# Patient Record
Sex: MALE | Race: WHITE | NOT HISPANIC OR LATINO | Employment: OTHER | URBAN - METROPOLITAN AREA
[De-identification: names, ages, dates, MRNs, and addresses within clinical notes are randomized per-mention and may not be internally consistent; named-entity substitution may affect disease eponyms.]

---

## 2017-12-28 ENCOUNTER — OFFICE VISIT (OUTPATIENT)
Dept: SURGERY | Facility: CLINIC | Age: 72
End: 2017-12-28

## 2017-12-28 VITALS
HEIGHT: 75 IN | SYSTOLIC BLOOD PRESSURE: 130 MMHG | WEIGHT: 203.4 LBS | BODY MASS INDEX: 25.29 KG/M2 | HEART RATE: 84 BPM | OXYGEN SATURATION: 98 % | DIASTOLIC BLOOD PRESSURE: 82 MMHG

## 2017-12-28 DIAGNOSIS — K40.90 RIGHT INGUINAL HERNIA: ICD-10-CM

## 2017-12-28 DIAGNOSIS — K42.9 UMBILICAL HERNIA WITHOUT OBSTRUCTION AND WITHOUT GANGRENE: Primary | ICD-10-CM

## 2017-12-28 PROCEDURE — 99203 OFFICE O/P NEW LOW 30 MIN: CPT | Performed by: SURGERY

## 2017-12-28 RX ORDER — SIMVASTATIN 20 MG
20 TABLET ORAL NIGHTLY
COMMUNITY

## 2017-12-28 RX ORDER — FEXOFENADINE HCL 180 MG/1
180 TABLET ORAL DAILY
COMMUNITY
End: 2018-05-25

## 2017-12-28 RX ORDER — TRIAMTERENE AND HYDROCHLOROTHIAZIDE 37.5; 25 MG/1; MG/1
1 CAPSULE ORAL EVERY MORNING
COMMUNITY

## 2017-12-28 RX ORDER — WARFARIN SODIUM 2 MG/1
TABLET ORAL
COMMUNITY
End: 2018-05-25

## 2017-12-28 RX ORDER — CEFAZOLIN SODIUM 2 G/100ML
2 INJECTION, SOLUTION INTRAVENOUS ONCE
Status: CANCELLED | OUTPATIENT
Start: 2018-01-29 | End: 2018-01-29

## 2017-12-28 RX ORDER — TADALAFIL 5 MG/1
5 TABLET ORAL DAILY PRN
COMMUNITY

## 2017-12-28 NOTE — PROGRESS NOTES
Subjective   Ajith Dimas is a 72 y.o. male who presents to the office in surgical consultation from Jamal Bah MD for a right inguinal hernia.    History of Present Illness     The patient recently noticed an asymmetry in the right groin while showering.  He noticed a bulge that was present.  He is very active with regular exercise and has not had any symptoms of pain.  There has been no change in his bowel or bladder function.    The patient also has a small bulge at the umbilicus that is been present for many years.  Recently he started developing pain at the umbilicus especially when stretching or when pressure is applied.    Review of Systems   Constitutional: Negative for activity change, appetite change, fatigue and fever.   HENT: Negative for trouble swallowing and voice change.    Respiratory: Negative for chest tightness and shortness of breath.    Cardiovascular: Negative for chest pain and palpitations.   Gastrointestinal: Positive for abdominal pain. Negative for blood in stool, constipation, diarrhea, nausea and vomiting.   Endocrine: Negative for cold intolerance and heat intolerance.   Genitourinary: Negative for dysuria and flank pain.   Neurological: Negative for dizziness and light-headedness.   Hematological: Negative for adenopathy. Does not bruise/bleed easily.   Psychiatric/Behavioral: Negative for agitation and confusion.     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 He appears well-developed and well-nourished.  Non-toxic appearance.   Eyes: EOM are normal. No scleral icterus.   Pulmonary/Chest: Effort normal. No respiratory distress.   Abdominal: Soft. Normal appearance. There is no tenderness. A hernia is present. Hernia confirmed positive in the ventral area (Small umbilical hernia that is tender to palpation) and confirmed positive in the right inguinal area (Moderately sized reducible right inguinal hernia). Hernia confirmed negative in the left inguinal area.   Neurological: He is alert and oriented to person, place, and time.   Skin: Skin is warm and dry.   Psychiatric: He has a normal mood and affect. His behavior is normal. Judgment and thought content normal.       Assessment/Plan       The primary encounter diagnosis was Umbilical hernia without obstruction and without gangrene. A diagnosis of Right inguinal hernia was also pertinent to this visit.    The patient has a moderately sized right inguinal hernia and a small symptomatic umbilical hernia.  He has been scheduled for a right inguinal hernia repair and an umbilical hernia repair.  The patient understands the indications, alternatives, risks, and benefits of the procedure and wishes to proceed.

## 2018-01-23 ENCOUNTER — APPOINTMENT (OUTPATIENT)
Dept: PREADMISSION TESTING | Facility: HOSPITAL | Age: 73
End: 2018-01-23

## 2018-01-23 VITALS
RESPIRATION RATE: 16 BRPM | HEIGHT: 76 IN | DIASTOLIC BLOOD PRESSURE: 81 MMHG | SYSTOLIC BLOOD PRESSURE: 120 MMHG | WEIGHT: 201.8 LBS | OXYGEN SATURATION: 98 % | HEART RATE: 82 BPM | TEMPERATURE: 97.5 F | BODY MASS INDEX: 24.57 KG/M2

## 2018-01-23 LAB
ANION GAP SERPL CALCULATED.3IONS-SCNC: 15.7 MMOL/L
BUN BLD-MCNC: 26 MG/DL (ref 8–23)
BUN/CREAT SERPL: 25.7 (ref 7–25)
CALCIUM SPEC-SCNC: 9.7 MG/DL (ref 8.6–10.5)
CHLORIDE SERPL-SCNC: 97 MMOL/L (ref 98–107)
CO2 SERPL-SCNC: 27.3 MMOL/L (ref 22–29)
CREAT BLD-MCNC: 1.01 MG/DL (ref 0.76–1.27)
DEPRECATED RDW RBC AUTO: 42 FL (ref 37–54)
ERYTHROCYTE [DISTWIDTH] IN BLOOD BY AUTOMATED COUNT: 12 % (ref 11.5–14.5)
GFR SERPL CREATININE-BSD FRML MDRD: 73 ML/MIN/1.73
GLUCOSE BLD-MCNC: 87 MG/DL (ref 65–99)
HCT VFR BLD AUTO: 41.9 % (ref 40.4–52.2)
HGB BLD-MCNC: 14.3 G/DL (ref 13.7–17.6)
MCH RBC QN AUTO: 32.7 PG (ref 27–32.7)
MCHC RBC AUTO-ENTMCNC: 34.1 G/DL (ref 32.6–36.4)
MCV RBC AUTO: 95.9 FL (ref 79.8–96.2)
PLATELET # BLD AUTO: 180 10*3/MM3 (ref 140–500)
PMV BLD AUTO: 11.5 FL (ref 6–12)
POTASSIUM BLD-SCNC: 3.5 MMOL/L (ref 3.5–5.2)
RBC # BLD AUTO: 4.37 10*6/MM3 (ref 4.6–6)
SODIUM BLD-SCNC: 140 MMOL/L (ref 136–145)
WBC NRBC COR # BLD: 8.21 10*3/MM3 (ref 4.5–10.7)

## 2018-01-23 PROCEDURE — 80048 BASIC METABOLIC PNL TOTAL CA: CPT | Performed by: SURGERY

## 2018-01-23 PROCEDURE — 85027 COMPLETE CBC AUTOMATED: CPT | Performed by: SURGERY

## 2018-01-23 PROCEDURE — 36415 COLL VENOUS BLD VENIPUNCTURE: CPT

## 2018-01-23 PROCEDURE — 93010 ELECTROCARDIOGRAM REPORT: CPT | Performed by: INTERNAL MEDICINE

## 2018-01-23 PROCEDURE — 93005 ELECTROCARDIOGRAM TRACING: CPT

## 2018-01-23 NOTE — DISCHARGE INSTRUCTIONS
Take the following medications the morning of surgery with a small sip of water: DYAZIDE    ARRIVAL TIME:  07:30        General Instructions:  • Do not eat solid food after midnight the night before surgery.  • You may drink clear liquids day of surgery but must stop at least one hour before your hospital arrival time.  • It is beneficial for you to have a clear drink that contains carbohydrates the day of surgery.  We suggest a 12 to 20 ounce bottle of Gatorade or Powerade for non-diabetic patients or a 12 to 20 ounce bottle of G2 or Powerade Zero for diabetic patients. (Pediatric patients, are not advised to drink a 12 to 20 ounce carbohydrate drink)    Clear liquids are liquids you can see through.  Nothing red in color.     Plain water                               Sports drinks  Sodas                                   Gelatin (Jell-O)  Fruit juices without pulp such as white grape juice and apple juice  Popsicles that contain no fruit or yogurt  Tea or coffee (no cream or milk added)  Gatorade / Powerade  G2 / Powerade Zero    • Patients who avoid smoking, chewing tobacco and alcohol for 4 weeks prior to surgery have a reduced risk of post-operative complications.  Quit smoking as many days before surgery as you can.  • Do not smoke, use chewing tobacco or drink alcohol the day of surgery.   • Bring any papers given to you in the doctor’s office.  • Wear clean comfortable clothes and socks.   • Do not wear contact lenses or make-up.  Bring a case for your glasses.   • Remove all piercings.  Leave jewelry and any other valuables at home.  • The Pre-Admission Testing nurse will instruct you to bring medications if unable to obtain an accurate list in Pre-Admission Testing.            Preventing a Surgical Site Infection:  • For 2 to 3 days before surgery, avoid shaving with a razor because the razor can irritate skin and make it easier to develop an infection.  • The night prior to surgery sleep in a clean bed  with clean clothing.  Do not allow pets to sleep with you.  • Shower on the morning of surgery using a fresh bar of anti-bacterial soap (such as Dial) and clean washcloth.  Dry with a clean towel and dress in clean clothing.  • Ask your surgeon if you will be receiving antibiotics prior to surgery.  • Make sure you, your family, and all healthcare providers clean their hands with soap and water or an alcohol based hand  before caring for you or your wound.    Day of surgery:  Upon arrival, a Pre-op nurse and Anesthesiologist will review your health history, obtain vital signs, and answer questions you may have.  The only belongings needed at this time will be your home medications and if applicable your C-PAP/BI-PAP machine.  If you are staying overnight your family can leave the rest of your belongings in the car and bring them to your room later.  A Pre-op nurse will start an IV and you may receive medication in preparation for surgery, including something to help you relax.  Your family will be able to see you in the Pre-op area.  While you are in surgery your family should notify the waiting room  if they leave the waiting room area and provide a contact phone number.    Please be aware that surgery does come with discomfort.  We want to make every effort to control your discomfort so please discuss any uncontrolled symptoms with your nurse.   Your doctor will most likely have prescribed pain medications.      If you are going home after surgery you will receive individualized written care instructions before being discharged.  A responsible adult must drive you to and from the hospital on the day of your surgery and stay with you for 24 hours.    If you are staying overnight following surgery, you will be transported to your hospital room following the recovery period.  [facility] has all private rooms.    If you have any questions please call Pre-Admission Testing at  [phone].  Deductibles and co-payments are collected on the day of service. Please be prepared to pay the required co-pay, deductible or deposit on the day of service as defined by your plan.

## 2018-01-29 ENCOUNTER — HOSPITAL ENCOUNTER (OUTPATIENT)
Facility: HOSPITAL | Age: 73
Setting detail: HOSPITAL OUTPATIENT SURGERY
Discharge: HOME | End: 2018-01-29
Attending: SURGERY | Admitting: SURGERY

## 2018-01-29 ENCOUNTER — ANESTHESIA (OUTPATIENT)
Dept: PERIOP | Facility: HOSPITAL | Age: 73
End: 2018-01-29

## 2018-01-29 ENCOUNTER — ANESTHESIA EVENT (OUTPATIENT)
Dept: PERIOP | Facility: HOSPITAL | Age: 73
End: 2018-01-29

## 2018-01-29 VITALS
HEART RATE: 66 BPM | RESPIRATION RATE: 16 BRPM | TEMPERATURE: 97.8 F | WEIGHT: 202.6 LBS | OXYGEN SATURATION: 97 % | DIASTOLIC BLOOD PRESSURE: 60 MMHG | BODY MASS INDEX: 25.19 KG/M2 | SYSTOLIC BLOOD PRESSURE: 107 MMHG | HEIGHT: 75 IN

## 2018-01-29 DIAGNOSIS — K40.90 RIGHT INGUINAL HERNIA: ICD-10-CM

## 2018-01-29 LAB
INR PPP: 1.02 (ref 0.9–1.1)
PROTHROMBIN TIME: 13 SECONDS (ref 11.7–14.2)

## 2018-01-29 PROCEDURE — S0260 H&P FOR SURGERY: HCPCS | Performed by: SURGERY

## 2018-01-29 PROCEDURE — C1781 MESH (IMPLANTABLE): HCPCS | Performed by: SURGERY

## 2018-01-29 PROCEDURE — 25010000003 CEFAZOLIN IN DEXTROSE 2-4 GM/100ML-% SOLUTION: Performed by: SURGERY

## 2018-01-29 PROCEDURE — 25010000002 ONDANSETRON PER 1 MG: Performed by: NURSE ANESTHETIST, CERTIFIED REGISTERED

## 2018-01-29 PROCEDURE — 49585 PR REPAIR UMBILICAL HERN,5+Y/O,REDUC: CPT | Performed by: SURGERY

## 2018-01-29 PROCEDURE — 25010000002 FENTANYL CITRATE (PF) 100 MCG/2ML SOLUTION: Performed by: NURSE ANESTHETIST, CERTIFIED REGISTERED

## 2018-01-29 PROCEDURE — 25010000002 MIDAZOLAM PER 1 MG: Performed by: ANESTHESIOLOGY

## 2018-01-29 PROCEDURE — 49505 PRP I/HERN INIT REDUC >5 YR: CPT | Performed by: SURGERY

## 2018-01-29 PROCEDURE — 85610 PROTHROMBIN TIME: CPT | Performed by: SURGERY

## 2018-01-29 PROCEDURE — 25010000002 PROPOFOL 10 MG/ML EMULSION: Performed by: NURSE ANESTHETIST, CERTIFIED REGISTERED

## 2018-01-29 DEVICE — VENTRALEX ST HERNIA PATCH
Type: IMPLANTABLE DEVICE | Site: ABDOMEN | Status: FUNCTIONAL
Brand: VENTRALEX ST HERNIA PATCH

## 2018-01-29 DEVICE — BARD SOFT MESH
Type: IMPLANTABLE DEVICE | Site: GROIN | Status: FUNCTIONAL
Brand: BARD SOFT MESH

## 2018-01-29 RX ORDER — PROMETHAZINE HYDROCHLORIDE 25 MG/1
12.5 TABLET ORAL ONCE AS NEEDED
Status: DISCONTINUED | OUTPATIENT
Start: 2018-01-29 | End: 2018-01-29 | Stop reason: HOSPADM

## 2018-01-29 RX ORDER — FAMOTIDINE 10 MG/ML
20 INJECTION, SOLUTION INTRAVENOUS ONCE
Status: COMPLETED | OUTPATIENT
Start: 2018-01-29 | End: 2018-01-29

## 2018-01-29 RX ORDER — HYDRALAZINE HYDROCHLORIDE 20 MG/ML
5 INJECTION INTRAMUSCULAR; INTRAVENOUS
Status: DISCONTINUED | OUTPATIENT
Start: 2018-01-29 | End: 2018-01-29 | Stop reason: HOSPADM

## 2018-01-29 RX ORDER — FENTANYL CITRATE 50 UG/ML
50 INJECTION, SOLUTION INTRAMUSCULAR; INTRAVENOUS
Status: DISCONTINUED | OUTPATIENT
Start: 2018-01-29 | End: 2018-01-29 | Stop reason: HOSPADM

## 2018-01-29 RX ORDER — EPHEDRINE SULFATE 50 MG/ML
INJECTION, SOLUTION INTRAVENOUS AS NEEDED
Status: DISCONTINUED | OUTPATIENT
Start: 2018-01-29 | End: 2018-01-29 | Stop reason: SURG

## 2018-01-29 RX ORDER — FENTANYL CITRATE 50 UG/ML
INJECTION, SOLUTION INTRAMUSCULAR; INTRAVENOUS AS NEEDED
Status: DISCONTINUED | OUTPATIENT
Start: 2018-01-29 | End: 2018-01-29 | Stop reason: SURG

## 2018-01-29 RX ORDER — PROMETHAZINE HYDROCHLORIDE 25 MG/ML
12.5 INJECTION, SOLUTION INTRAMUSCULAR; INTRAVENOUS ONCE AS NEEDED
Status: DISCONTINUED | OUTPATIENT
Start: 2018-01-29 | End: 2018-01-29 | Stop reason: HOSPADM

## 2018-01-29 RX ORDER — DIPHENHYDRAMINE HYDROCHLORIDE 50 MG/ML
12.5 INJECTION INTRAMUSCULAR; INTRAVENOUS
Status: DISCONTINUED | OUTPATIENT
Start: 2018-01-29 | End: 2018-01-29 | Stop reason: HOSPADM

## 2018-01-29 RX ORDER — PROMETHAZINE HYDROCHLORIDE 25 MG/1
25 SUPPOSITORY RECTAL ONCE AS NEEDED
Status: DISCONTINUED | OUTPATIENT
Start: 2018-01-29 | End: 2018-01-29 | Stop reason: HOSPADM

## 2018-01-29 RX ORDER — ONDANSETRON 4 MG/1
4 TABLET, FILM COATED ORAL EVERY 6 HOURS PRN
Qty: 20 TABLET | Refills: 0 | Status: SHIPPED | OUTPATIENT
Start: 2018-01-29 | End: 2018-02-13

## 2018-01-29 RX ORDER — ONDANSETRON 2 MG/ML
INJECTION INTRAMUSCULAR; INTRAVENOUS AS NEEDED
Status: DISCONTINUED | OUTPATIENT
Start: 2018-01-29 | End: 2018-01-29 | Stop reason: SURG

## 2018-01-29 RX ORDER — LIDOCAINE HYDROCHLORIDE 10 MG/ML
0.5 INJECTION, SOLUTION EPIDURAL; INFILTRATION; INTRACAUDAL; PERINEURAL ONCE AS NEEDED
Status: DISCONTINUED | OUTPATIENT
Start: 2018-01-29 | End: 2018-01-29 | Stop reason: HOSPADM

## 2018-01-29 RX ORDER — EPHEDRINE SULFATE 50 MG/ML
5 INJECTION, SOLUTION INTRAVENOUS ONCE AS NEEDED
Status: DISCONTINUED | OUTPATIENT
Start: 2018-01-29 | End: 2018-01-29 | Stop reason: HOSPADM

## 2018-01-29 RX ORDER — MIDAZOLAM HYDROCHLORIDE 1 MG/ML
2 INJECTION INTRAMUSCULAR; INTRAVENOUS
Status: DISCONTINUED | OUTPATIENT
Start: 2018-01-29 | End: 2018-01-29 | Stop reason: HOSPADM

## 2018-01-29 RX ORDER — LIDOCAINE HYDROCHLORIDE 20 MG/ML
INJECTION, SOLUTION INFILTRATION; PERINEURAL AS NEEDED
Status: DISCONTINUED | OUTPATIENT
Start: 2018-01-29 | End: 2018-01-29 | Stop reason: SURG

## 2018-01-29 RX ORDER — OXYCODONE AND ACETAMINOPHEN 7.5; 325 MG/1; MG/1
1 TABLET ORAL ONCE AS NEEDED
Status: COMPLETED | OUTPATIENT
Start: 2018-01-29 | End: 2018-01-29

## 2018-01-29 RX ORDER — PROPOFOL 10 MG/ML
VIAL (ML) INTRAVENOUS AS NEEDED
Status: DISCONTINUED | OUTPATIENT
Start: 2018-01-29 | End: 2018-01-29 | Stop reason: SURG

## 2018-01-29 RX ORDER — SODIUM CHLORIDE, SODIUM LACTATE, POTASSIUM CHLORIDE, CALCIUM CHLORIDE 600; 310; 30; 20 MG/100ML; MG/100ML; MG/100ML; MG/100ML
9 INJECTION, SOLUTION INTRAVENOUS CONTINUOUS
Status: DISCONTINUED | OUTPATIENT
Start: 2018-01-29 | End: 2018-01-29 | Stop reason: HOSPADM

## 2018-01-29 RX ORDER — MIDAZOLAM HYDROCHLORIDE 1 MG/ML
1 INJECTION INTRAMUSCULAR; INTRAVENOUS
Status: DISCONTINUED | OUTPATIENT
Start: 2018-01-29 | End: 2018-01-29 | Stop reason: HOSPADM

## 2018-01-29 RX ORDER — OXYCODONE HYDROCHLORIDE AND ACETAMINOPHEN 5; 325 MG/1; MG/1
TABLET ORAL
Qty: 40 TABLET | Refills: 0 | Status: SHIPPED | OUTPATIENT
Start: 2018-01-29 | End: 2018-02-13

## 2018-01-29 RX ORDER — NALOXONE HCL 0.4 MG/ML
0.2 VIAL (ML) INJECTION AS NEEDED
Status: DISCONTINUED | OUTPATIENT
Start: 2018-01-29 | End: 2018-01-29 | Stop reason: HOSPADM

## 2018-01-29 RX ORDER — SODIUM CHLORIDE 0.9 % (FLUSH) 0.9 %
1-10 SYRINGE (ML) INJECTION AS NEEDED
Status: DISCONTINUED | OUTPATIENT
Start: 2018-01-29 | End: 2018-01-29 | Stop reason: HOSPADM

## 2018-01-29 RX ORDER — CEFAZOLIN SODIUM 2 G/100ML
2 INJECTION, SOLUTION INTRAVENOUS ONCE
Status: COMPLETED | OUTPATIENT
Start: 2018-01-29 | End: 2018-01-29

## 2018-01-29 RX ORDER — PROMETHAZINE HYDROCHLORIDE 25 MG/1
25 TABLET ORAL ONCE AS NEEDED
Status: DISCONTINUED | OUTPATIENT
Start: 2018-01-29 | End: 2018-01-29 | Stop reason: HOSPADM

## 2018-01-29 RX ORDER — ONDANSETRON 2 MG/ML
4 INJECTION INTRAMUSCULAR; INTRAVENOUS ONCE AS NEEDED
Status: DISCONTINUED | OUTPATIENT
Start: 2018-01-29 | End: 2018-01-29 | Stop reason: HOSPADM

## 2018-01-29 RX ORDER — DIAZEPAM 5 MG/1
10 TABLET ORAL ONCE AS NEEDED
Status: DISCONTINUED | OUTPATIENT
Start: 2018-01-29 | End: 2018-01-29 | Stop reason: HOSPADM

## 2018-01-29 RX ORDER — FLUMAZENIL 0.1 MG/ML
0.2 INJECTION INTRAVENOUS AS NEEDED
Status: DISCONTINUED | OUTPATIENT
Start: 2018-01-29 | End: 2018-01-29 | Stop reason: HOSPADM

## 2018-01-29 RX ORDER — HYDROCODONE BITARTRATE AND ACETAMINOPHEN 7.5; 325 MG/1; MG/1
1 TABLET ORAL ONCE AS NEEDED
Status: DISCONTINUED | OUTPATIENT
Start: 2018-01-29 | End: 2018-01-29 | Stop reason: HOSPADM

## 2018-01-29 RX ORDER — BUPIVACAINE HYDROCHLORIDE AND EPINEPHRINE 5; 5 MG/ML; UG/ML
INJECTION, SOLUTION PERINEURAL AS NEEDED
Status: DISCONTINUED | OUTPATIENT
Start: 2018-01-29 | End: 2018-01-29 | Stop reason: HOSPADM

## 2018-01-29 RX ORDER — LABETALOL HYDROCHLORIDE 5 MG/ML
5 INJECTION, SOLUTION INTRAVENOUS
Status: DISCONTINUED | OUTPATIENT
Start: 2018-01-29 | End: 2018-01-29 | Stop reason: HOSPADM

## 2018-01-29 RX ORDER — SCOLOPAMINE TRANSDERMAL SYSTEM 1 MG/1
1 PATCH, EXTENDED RELEASE TRANSDERMAL ONCE AS NEEDED
Status: DISCONTINUED | OUTPATIENT
Start: 2018-01-29 | End: 2018-01-29 | Stop reason: HOSPADM

## 2018-01-29 RX ORDER — HYDROMORPHONE HCL 110MG/55ML
0.5 PATIENT CONTROLLED ANALGESIA SYRINGE INTRAVENOUS
Status: DISCONTINUED | OUTPATIENT
Start: 2018-01-29 | End: 2018-01-29 | Stop reason: HOSPADM

## 2018-01-29 RX ADMIN — ONDANSETRON 4 MG: 2 INJECTION INTRAMUSCULAR; INTRAVENOUS at 11:23

## 2018-01-29 RX ADMIN — LIDOCAINE HYDROCHLORIDE 100 MG: 20 INJECTION, SOLUTION INFILTRATION; PERINEURAL at 09:32

## 2018-01-29 RX ADMIN — MIDAZOLAM 1 MG: 1 INJECTION INTRAMUSCULAR; INTRAVENOUS at 07:53

## 2018-01-29 RX ADMIN — FENTANYL CITRATE 25 MCG: 50 INJECTION, SOLUTION INTRAMUSCULAR; INTRAVENOUS at 11:10

## 2018-01-29 RX ADMIN — EPHEDRINE SULFATE 10 MG: 50 INJECTION INTRAMUSCULAR; INTRAVENOUS; SUBCUTANEOUS at 09:50

## 2018-01-29 RX ADMIN — SCOPALAMINE 1 PATCH: 1 PATCH, EXTENDED RELEASE TRANSDERMAL at 07:53

## 2018-01-29 RX ADMIN — SODIUM CHLORIDE, POTASSIUM CHLORIDE, SODIUM LACTATE AND CALCIUM CHLORIDE 9 ML/HR: 600; 310; 30; 20 INJECTION, SOLUTION INTRAVENOUS at 07:53

## 2018-01-29 RX ADMIN — OXYCODONE HYDROCHLORIDE AND ACETAMINOPHEN 1 TABLET: 7.5; 325 TABLET ORAL at 12:28

## 2018-01-29 RX ADMIN — FENTANYL CITRATE 50 MCG: 50 INJECTION, SOLUTION INTRAMUSCULAR; INTRAVENOUS at 10:48

## 2018-01-29 RX ADMIN — FENTANYL CITRATE 75 MCG: 50 INJECTION, SOLUTION INTRAMUSCULAR; INTRAVENOUS at 09:46

## 2018-01-29 RX ADMIN — PROPOFOL 200 MG: 10 INJECTION, EMULSION INTRAVENOUS at 09:32

## 2018-01-29 RX ADMIN — CEFAZOLIN SODIUM 2 G: 2 INJECTION, SOLUTION INTRAVENOUS at 09:32

## 2018-01-29 RX ADMIN — FENTANYL CITRATE 75 MCG: 50 INJECTION, SOLUTION INTRAMUSCULAR; INTRAVENOUS at 09:32

## 2018-01-29 RX ADMIN — FAMOTIDINE 20 MG: 10 INJECTION, SOLUTION INTRAVENOUS at 07:53

## 2018-01-29 RX ADMIN — FENTANYL CITRATE 25 MCG: 50 INJECTION, SOLUTION INTRAMUSCULAR; INTRAVENOUS at 10:16

## 2018-01-29 NOTE — OP NOTE
Surgeon: Jose Pierson Jr., M.D.    Assistant: Jose Pierson M.D.    Pre-Operative Diagnosis: Right inguinal hernia [K40.90] and umbilical hernia    Post-Op Diagnosis Codes:     * Right inguinal hernia [K40.90] and umbilical hernia      Procedure Performed: Right inguinal hernia repair and umbilical hernia repair with mesh    Indications:     The patient is a very pleasant 72-year-old white male who is developed a bulge in the right groin that is gotten larger and increasingly symptomatic.  He also is a bulge at the umbilicus that is.  We symptomatic.  He has been scheduled for a right inguinal hernia repair and umbilical hernia repair.  The patient understands the indications, alternatives, risks, and benefits of the procedure and wishes to proceed.    Procedure:     The patient was identified and taken to the operating room where he was placed in the supine position on the operating table.  Monitors were placed and he underwent a general endotracheal anesthesia and was appropriately monitored throughout the case by the anesthesia personnel.  The right groin was prepped and draped in the standard surgical fashion.  A standard groin incision was made with a scalpel and carried through the skin into the subcutaneous tissue.  The subcutaneous tissue was divided using Bovie electrocautery to the external oblique aponeurosis which was then divided in direction of its fibers.  The spermatic cord was then identified, surrounded, and elevated out of the groin with a Penrose drain.  The spermatic cord was skeletonized by dividing cremasteric fibers using Bovie electrocautery.  The patient was noted to have a pantaloon hernia.  The hernia was freed from attachments to the spermatic cord and the indirect hernia was divided and a high ligation fashion using 2-0 Vicryl suture and the direct hernia was completely reduced.  A piece of mesh was selected, soaked in antibiotics, and fashioned appropriately using a 3 x 6 Bard  soft mesh.  It was then sutured in place using a Denny type repair.  It was sutured to the pubic tubercle and along the inguinal ligament using 2-0 proline sutures.  It was then tacked along conjoined tendon using 0 Ethibond sutures in an interrupted fashion.  Legs were created to reestablish the internal ring and secured with the 0 Ethibond sutures.  The cord was noted to be hemostatic.  The area was infiltrated with 0.5% Marcaine with epinephrine as well as Exparel.  The spermatic cord was returned to its original position.  The external oblique aponeurosis was reapproximated using 2-0 Vicryl sutures.  Preethi's fascia was reapproximated using 3-0 Vicryl sutures in a running fashion.  The skin was then closed with 4-0 Monocryl in a subcuticular fashion followed by Mastisol and Steri-Strips and a sterile dressing.      Attention was then turned to the umbilical hernia repair.  A periumbilical incision was made and carried through the skin into the subcutaneous tissue.  The subcutaneous tissue was divided using Bovie electrocautery down to the level of the fascia and the hernia was surrounded.  The umbilicus was then taken off the hernia sac using Bovie electrocautery.  The hernia sac was freed from all subcutaneous attachments all the way down to the fascial defect.  The hernia was freed at the fascial edge using Bovie electrocautery and completely reduced.  The hernia defect was about 2 cm in size.  The preperitoneal space was dissected to permit the mesh.  A piece of Ventralex ST mesh, size small, was selected and placed in a subfascial position in the preperitoneal space.  The fascia was then closed with 0 PDS sutures in an interrupted fashion grasping the legs of the mesh within the closure to secure the mesh in the proper position.  The entire area was then infiltrated with 0.5% Marcaine with epinephrine.  The umbilicus was tacked down to the fascia using 3-0 Vicryls suture in an interrupted fashion.   The subcutaneous tissue was then closed with 3-0 Vicryls suture in a running fashion.  The skin was then closed with a 4-0 Monocryl in a subcuticular fashion followed by benzoin and Steri-Strips.  A sterile dressing was applied.     The sponge, needle, and instrument counts were correct at the end the case.  The patient tolerated procedure well and was transferred to the recovery room in stable condition.    Estimated Blood Loss:  minimal      Specimens:   Order Name Source Comment Collection Info Order Time   PROTIME-INR   Collected By: Aileen Casas RN 1/29/2018  7:15 AM

## 2018-01-29 NOTE — H&P
Subjective       Ajith Dimas is a 72 y.o. male who presents for a right inguinal hernia and umbilical hernia.     History of Present Illness      The patient recently noticed an asymmetry in the right groin while showering.  He noticed a bulge that was present.  He is very active with regular exercise and has not had any symptoms of pain.  There has been no change in his bowel or bladder function.     The patient also has a small bulge at the umbilicus that is been present for many years.  Recently he started developing pain at the umbilicus especially when stretching or when pressure is applied.     Review of Systems   Constitutional: Negative for activity change, appetite change, fatigue and fever.   HENT: Negative for trouble swallowing and voice change.    Respiratory: Negative for chest tightness and shortness of breath.    Cardiovascular: Negative for chest pain and palpitations.   Gastrointestinal: Positive for abdominal pain. Negative for blood in stool, constipation, diarrhea, nausea and vomiting.   Endocrine: Negative for cold intolerance and heat intolerance.   Genitourinary: Negative for dysuria and flank pain.   Neurological: Negative for dizziness and light-headedness.   Hematological: Negative for adenopathy. Does not bruise/bleed easily.   Psychiatric/Behavioral: Negative for agitation and confusion.       Medical History    Past Medical History:   Diagnosis Date   • Deep vein thrombosis     • Hernia, inguinal     • Hypertension            Surgical History          Past Surgical History:   Procedure Laterality Date   • CARDIAC CATHETERIZATION       • TONSILLECTOMY AND ADENOIDECTOMY                   Family History   Problem Relation Age of Onset   • Breast cancer Mother     • Colon polyps Father     • Melanoma Brother     • Prostate cancer Brother         Social History    Social History            Social History   • Marital status: Single       Spouse name: N/A   • Number of children: N/A   • Years  of education: N/A           Occupational History   • 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 He appears well-developed and well-nourished.  Non-toxic appearance.   Eyes: EOM are normal. No scleral icterus.   Pulmonary/Chest: Effort normal. No respiratory distress.   Abdominal: Soft. Normal appearance. There is no tenderness. A hernia is present. Hernia confirmed positive in the ventral area (Small umbilical hernia that is tender to palpation) and confirmed positive in the right inguinal area (Moderately sized reducible right inguinal hernia). Hernia confirmed negative in the left inguinal area.   Neurological: He is alert and oriented to person, place, and time.   Skin: Skin is warm and dry.   Psychiatric: He has a normal mood and affect. His behavior is normal. Judgment and thought content normal.            Assessment/Plan             The primary encounter diagnosis was Umbilical hernia without obstruction and without gangrene. A diagnosis of Right inguinal hernia was also pertinent to this visit.     The patient has a moderately sized right inguinal hernia and a small symptomatic umbilical hernia.  He has been scheduled for a right inguinal hernia repair and an umbilical hernia repair.  The patient understands the indications, alternatives, risks, and benefits of the procedure and wishes to proceed.

## 2018-01-29 NOTE — PLAN OF CARE
Problem: Patient Care Overview (Adult)  Goal: Plan of Care Review  Outcome: Outcome(s) achieved Date Met: 01/29/18 01/29/18 1408   Coping/Psychosocial Response Interventions   Plan Of Care Reviewed With patient;significant other   Patient Care Overview   Progress improving   Outcome Evaluation   Outcome Summary/Follow up Plan ready for discharge     Goal: Adult Individualization and Mutuality  Outcome: Outcome(s) achieved Date Met: 01/29/18    Goal: Discharge Needs Assessment  Outcome: Outcome(s) achieved Date Met: 01/29/18      Problem: Perioperative Period (Adult)  Goal: Signs and Symptoms of Listed Potential Problems Will be Absent or Manageable (Perioperative Period)  Outcome: Outcome(s) achieved Date Met: 01/29/18 01/29/18 1408   Perioperative Period   Problems Assessed (Perioperative Period) pain;wound complications;hemorrhage;hypoxia/hypoxemia;perioperative injury;situational response;physiologic stress response;infection   Problems Present (Perioperative Period) pain

## 2018-01-29 NOTE — DISCHARGE INSTRUCTIONS
Please remove Scopolamine patch from behind Right ear in the morning. Fold in half and discard in trash. Wash hands immediately.        Dr. Jose Pierson and Dr. Isai Mercado  [address]  [place] [id number] [phone]    Discharge Instructions for Hernia Surgery      1. Go home, rest and take it easy today; however, you should get up and move about several times today to reduce the risk of developing a clot in your legs.      2. You may experience some dizziness or memory loss from the anesthesia.  This may last for the next 24 hours.  Someone should plan on staying with you for the first 24 hours for your safety.    3. Do not make any important legal decisions or sign any legal papers for the next 24 hours.      4. Eat and drink lightly today.  Start off with liquids, jello, soup, crackers or other bland foods at first. You may advance your diet tomorrow as tolerated as long as you do not experience any nausea or vomiting.     5. You may remove your outer dressings in 2 days.  The white tapes called steri-strips should stay in place.  They will fall off on their own in 1-2 weeks.  Do not worry if they come off sooner.      6. You may notice some bleeding/drainage on your outer dressings. A little bloody drainage is normal. If the bleeding/drainage is such that the bandage cannot absorb it, remove the dressing, apply clean gauze and apply firm pressure for a full 15 minutes.  If the bleeding continues, please call me.    7. You may shower tomorrow.  No tub baths until your incisions are completely healed.     8. No lifting > 20 lbs. until you are seen at your follow-up visit.         9. You have received a prescription for a narcotic pain medicine, as you will have some pain following surgery.   You will not be totally pain free, but your pain medicine should make the pain tolerable.  Please take your pain medicine as prescribed and always take your pills with food to prevent nausea. If you are  having severe pain that cannot be controlled by the pain medicine, please contact me.      10. If you had a laparoscopic surgery, it is not unusual to experience pain/discomfort in your shoulders or under your ribs after surgery.  It is from the gas used during the laparoscopic procedure and usually lasts 1-3 days.  The prescription pain medicine is used to treat the surgical pain and does not typically alleviate this “gassy” pain.     11. No driving for 24 hours and for as long as you are taking your prescription pain medicine.      12. You will need to call the office at [phone] to schedule a follow-up appointment in 2 weeks.           13. Remember to contact me for any of the following:    • Fever > 101 degrees  • Severe pain that cannot be controlled by taking your pain pills  • Severe nausea or vomiting   • Significant bleeding of your incisions  • Drainage that has a bad smell or is yellow or green in appearance  • Any other questions or concerns        Additional Instruction for Inguinal Hernia Patients Only    1. If you did not urinate at the hospital after your surgery or if you feel the need to urinate and cannot, this will necessitate a return to the Emergency Room for placement of a urinary catheter.  You should also notify me as well.  As a rule, you should be able to empty your bladder within 4-6 hours after discharge from the hospital.      You may notice some scrotal bruising and/or swelling. A scrotal support or briefs as well as ice packs may be used to alleviate discomfort

## 2018-01-29 NOTE — ANESTHESIA POSTPROCEDURE EVALUATION
Patient: Ajith Dimas    Procedure Summary     Date Anesthesia Start Anesthesia Stop Room / Location    01/29/18 0929 1122  [facility] /  [facility]       Procedure Diagnosis Surgeon Provider    RIGHT INGUINAL HERNIA REPAIR WITH MESH (Right Abdomen); UMBILICAL HERNIA REPAIR WITH MESH (N/A Abdomen) Right inguinal hernia  (Right inguinal hernia [K40.90]) MD Elver Best Jr., MD          Anesthesia Type: general  Last vitals  BP   124/44 (01/29/18 1305)   Temp   36.6 °C (97.8 °F) (01/29/18 1230)   Pulse   78 (01/29/18 1305)   Resp   16 (01/29/18 1305)     SpO2   97 % (01/29/18 1305)     Post Anesthesia Care and Evaluation    Patient location during evaluation: PACU  Patient participation: complete - patient participated  Level of consciousness: awake and alert  Pain management: adequate  Airway patency: patent  Anesthetic complications: No anesthetic complications    Cardiovascular status: acceptable  Respiratory status: acceptable  Hydration status: acceptable

## 2018-01-29 NOTE — ANESTHESIA PREPROCEDURE EVALUATION
Anesthesia Evaluation     Patient summary reviewed and Nursing notes reviewed   NPO Solid Status: > 8 hours  NPO Liquid Status: > 4 hours     Airway   Mallampati: II  TM distance: >3 FB  Neck ROM: full  no difficulty expected  Dental - normal exam     Pulmonary - negative pulmonary ROS and normal exam   Cardiovascular - normal exam  Exercise tolerance: good (4-7 METS)    ECG reviewed  PT is on anticoagulation therapy  Rhythm: regular  Rate: normal    (+) hypertension, DVT resolved, hyperlipidemia      Neuro/Psych- negative ROS  GI/Hepatic/Renal/Endo      Musculoskeletal (-) negative ROS    Abdominal  - normal exam   Substance History - negative use     OB/GYN          Other - negative ROS                                               Anesthesia Plan    ASA 2     general     intravenous induction   Anesthetic plan and risks discussed with patient.

## 2018-01-29 NOTE — PLAN OF CARE
Problem: Patient Care Overview (Adult)  Goal: Plan of Care Review  Outcome: Ongoing (interventions implemented as appropriate)   01/29/18 0742   Coping/Psychosocial Response Interventions   Plan Of Care Reviewed With patient   Patient Care Overview   Progress no change     Goal: Adult Individualization and Mutuality  Outcome: Ongoing (interventions implemented as appropriate)   01/29/18 0742   Individualization   Patient Specific Preferences Call pt Ajith   Patient Specific Goals No infection after surgery.   Patient Specific Interventions Teach good hand hygiene.     Goal: Discharge Needs Assessment  Outcome: Ongoing (interventions implemented as appropriate)   01/29/18 0742   Discharge Needs Assessment   Concerns To Be Addressed denies needs/concerns at this time   Self-Care   Equipment Currently Used at Home none   Current Health   Anticipated Changes Related to Illness none       Problem: Perioperative Period (Adult)  Goal: Signs and Symptoms of Listed Potential Problems Will be Absent or Manageable (Perioperative Period)  Outcome: Ongoing (interventions implemented as appropriate)   01/29/18 0742   Perioperative Period   Problems Present (Perioperative Period) none

## 2018-02-13 ENCOUNTER — OFFICE VISIT (OUTPATIENT)
Dept: SURGERY | Facility: CLINIC | Age: 73
End: 2018-02-13

## 2018-02-13 VITALS
HEIGHT: 75 IN | DIASTOLIC BLOOD PRESSURE: 84 MMHG | BODY MASS INDEX: 24.99 KG/M2 | HEART RATE: 85 BPM | OXYGEN SATURATION: 98 % | SYSTOLIC BLOOD PRESSURE: 110 MMHG | WEIGHT: 201 LBS

## 2018-02-13 DIAGNOSIS — Z48.89 POSTOPERATIVE VISIT: Primary | ICD-10-CM

## 2018-02-13 PROCEDURE — 99024 POSTOP FOLLOW-UP VISIT: CPT | Performed by: SURGERY

## 2018-02-13 NOTE — PROGRESS NOTES
Subjective   Ajith Dimas is a 72 y.o. male who returns to the office after undergoing a right inguinal hernia repair and umbilical hernia repair on 1/29/2018.     History of Present Illness     The patient is recovering well with no significant postop symptoms.  He is having no abdominal pain.  He has a good appetite and normal bowel function.  His energy level is good.      Review of Systems   Constitutional: Negative for activity change, appetite change, fatigue and fever.   Respiratory: Negative for chest tightness and shortness of breath.    Cardiovascular: Negative for chest pain and palpitations.   Gastrointestinal: Negative for abdominal pain, constipation, diarrhea and nausea.   Skin: Negative for rash and wound.   Psychiatric/Behavioral: Negative for agitation and confusion.       Objective   Physical Exam   Constitutional:  Non-toxic appearance. He does not appear ill. No distress.   Pulmonary/Chest: Effort normal. No respiratory distress.   Abdominal: Soft. Normal appearance. There is no tenderness.   Neurological: He is alert.   Skin:   Incision: intact with no evidence of infection.   Psychiatric: He has a normal mood and affect. His behavior is normal.       Assessment/Plan   The encounter diagnosis was Postoperative visit.    The patient is recovering well from his right inguinal hernia repair and umbilical hernia repair.  He was advised to avoid heavy lifting for another 1 month.  He will follow-up on an as-needed basis.

## 2018-05-24 ENCOUNTER — OFFICE VISIT (OUTPATIENT)
Dept: SURGERY | Facility: CLINIC | Age: 73
End: 2018-05-24

## 2018-05-24 VITALS
OXYGEN SATURATION: 97 % | SYSTOLIC BLOOD PRESSURE: 110 MMHG | BODY MASS INDEX: 24.94 KG/M2 | WEIGHT: 199.5 LBS | DIASTOLIC BLOOD PRESSURE: 69 MMHG | HEART RATE: 89 BPM

## 2018-05-24 DIAGNOSIS — K40.90 LEFT INGUINAL HERNIA: Primary | ICD-10-CM

## 2018-05-24 PROCEDURE — 99214 OFFICE O/P EST MOD 30 MIN: CPT | Performed by: SURGERY

## 2018-05-24 RX ORDER — CEFAZOLIN SODIUM 2 G/100ML
2 INJECTION, SOLUTION INTRAVENOUS ONCE
Status: CANCELLED | OUTPATIENT
Start: 2018-07-02 | End: 2018-07-02

## 2018-05-25 ENCOUNTER — APPOINTMENT (OUTPATIENT)
Dept: CARDIOLOGY | Facility: HOSPITAL | Age: 73
End: 2018-05-25
Attending: INTERNAL MEDICINE

## 2018-05-25 ENCOUNTER — HOSPITAL ENCOUNTER (OUTPATIENT)
Facility: HOSPITAL | Age: 73
Setting detail: OBSERVATION
Discharge: HOME | End: 2018-05-26
Attending: EMERGENCY MEDICINE | Admitting: INTERNAL MEDICINE

## 2018-05-25 ENCOUNTER — APPOINTMENT (OUTPATIENT)
Dept: MRI IMAGING | Facility: HOSPITAL | Age: 73
End: 2018-05-25

## 2018-05-25 ENCOUNTER — APPOINTMENT (OUTPATIENT)
Dept: GENERAL RADIOLOGY | Facility: HOSPITAL | Age: 73
End: 2018-05-25

## 2018-05-25 ENCOUNTER — APPOINTMENT (OUTPATIENT)
Dept: CT IMAGING | Facility: HOSPITAL | Age: 73
End: 2018-05-25

## 2018-05-25 DIAGNOSIS — G45.4 TRANSIENT GLOBAL AMNESIA: Primary | ICD-10-CM

## 2018-05-25 DIAGNOSIS — Q28.3 BRAIN VASCULAR MALFORMATION: ICD-10-CM

## 2018-05-25 PROBLEM — R76.0 ANTICARDIOLIPIN ANTIBODY POSITIVE: Status: ACTIVE | Noted: 2018-05-25

## 2018-05-25 PROBLEM — E78.5 HYPERLIPIDEMIA: Status: ACTIVE | Noted: 2018-05-25

## 2018-05-25 PROBLEM — Z86.718 HISTORY OF DVT (DEEP VEIN THROMBOSIS): Status: ACTIVE | Noted: 2018-05-25

## 2018-05-25 PROBLEM — Z79.01 LONG TERM CURRENT USE OF ANTICOAGULANT: Status: ACTIVE | Noted: 2018-05-25

## 2018-05-25 PROBLEM — I10 ESSENTIAL HYPERTENSION: Status: ACTIVE | Noted: 2018-05-25

## 2018-05-25 LAB
ALBUMIN SERPL-MCNC: 4.3 G/DL (ref 3.5–5.2)
ALBUMIN/GLOB SERPL: 1.4 G/DL
ALP SERPL-CCNC: 77 U/L (ref 39–117)
ALT SERPL W P-5'-P-CCNC: 19 U/L (ref 1–41)
ANION GAP SERPL CALCULATED.3IONS-SCNC: 13.4 MMOL/L
APTT PPP: 30.4 SECONDS (ref 22.7–35.4)
AST SERPL-CCNC: 23 U/L (ref 1–40)
BASOPHILS # BLD AUTO: 0.02 10*3/MM3 (ref 0–0.2)
BASOPHILS NFR BLD AUTO: 0.3 % (ref 0–1.5)
BILIRUB SERPL-MCNC: 0.4 MG/DL (ref 0.1–1.2)
BILIRUB UR QL STRIP: NEGATIVE
BUN BLD-MCNC: 25 MG/DL (ref 8–23)
BUN/CREAT SERPL: 27.2 (ref 7–25)
CALCIUM SPEC-SCNC: 9.2 MG/DL (ref 8.6–10.5)
CHLORIDE SERPL-SCNC: 99 MMOL/L (ref 98–107)
CLARITY UR: CLEAR
CO2 SERPL-SCNC: 27.6 MMOL/L (ref 22–29)
COLOR UR: YELLOW
CREAT BLD-MCNC: 0.92 MG/DL (ref 0.76–1.27)
D-LACTATE SERPL-SCNC: 1.1 MMOL/L (ref 0.5–2)
DEPRECATED RDW RBC AUTO: 40.6 FL (ref 37–54)
EOSINOPHIL # BLD AUTO: 0.08 10*3/MM3 (ref 0–0.7)
EOSINOPHIL NFR BLD AUTO: 1.1 % (ref 0.3–6.2)
ERYTHROCYTE [DISTWIDTH] IN BLOOD BY AUTOMATED COUNT: 11.9 % (ref 11.5–14.5)
GFR SERPL CREATININE-BSD FRML MDRD: 81 ML/MIN/1.73
GLOBULIN UR ELPH-MCNC: 3.1 GM/DL
GLUCOSE BLD-MCNC: 105 MG/DL (ref 65–99)
GLUCOSE UR STRIP-MCNC: NEGATIVE MG/DL
HCT VFR BLD AUTO: 42.5 % (ref 40.4–52.2)
HGB BLD-MCNC: 14.9 G/DL (ref 13.7–17.6)
HGB UR QL STRIP.AUTO: NEGATIVE
HOLD SPECIMEN: NORMAL
IMM GRANULOCYTES # BLD: 0.01 10*3/MM3 (ref 0–0.03)
IMM GRANULOCYTES NFR BLD: 0.1 % (ref 0–0.5)
INR PPP: 1.88 (ref 0.9–1.1)
INR PPP: 1.99 (ref 0.9–1.1)
KETONES UR QL STRIP: NEGATIVE
LEUKOCYTE ESTERASE UR QL STRIP.AUTO: NEGATIVE
LYMPHOCYTES # BLD AUTO: 1.46 10*3/MM3 (ref 0.9–4.8)
LYMPHOCYTES NFR BLD AUTO: 19.6 % (ref 19.6–45.3)
MCH RBC QN AUTO: 33.1 PG (ref 27–32.7)
MCHC RBC AUTO-ENTMCNC: 35.1 G/DL (ref 32.6–36.4)
MCV RBC AUTO: 94.4 FL (ref 79.8–96.2)
MONOCYTES # BLD AUTO: 0.5 10*3/MM3 (ref 0.2–1.2)
MONOCYTES NFR BLD AUTO: 6.7 % (ref 5–12)
NEUTROPHILS # BLD AUTO: 5.4 10*3/MM3 (ref 1.9–8.1)
NEUTROPHILS NFR BLD AUTO: 72.3 % (ref 42.7–76)
NITRITE UR QL STRIP: NEGATIVE
PH UR STRIP.AUTO: 5.5 [PH] (ref 5–8)
PLATELET # BLD AUTO: 183 10*3/MM3 (ref 140–500)
PMV BLD AUTO: 10.9 FL (ref 6–12)
POTASSIUM BLD-SCNC: 3.5 MMOL/L (ref 3.5–5.2)
PROT SERPL-MCNC: 7.4 G/DL (ref 6–8.5)
PROT UR QL STRIP: ABNORMAL
PROTHROMBIN TIME: 21.3 SECONDS (ref 11.7–14.2)
PROTHROMBIN TIME: 22.3 SECONDS (ref 11.7–14.2)
RBC # BLD AUTO: 4.5 10*6/MM3 (ref 4.6–6)
SODIUM BLD-SCNC: 140 MMOL/L (ref 136–145)
SP GR UR STRIP: 1.02 (ref 1–1.03)
TROPONIN T SERPL-MCNC: <0.01 NG/ML (ref 0–0.03)
TSH SERPL DL<=0.05 MIU/L-ACNC: 2.51 MIU/ML (ref 0.27–4.2)
UROBILINOGEN UR QL STRIP: ABNORMAL
VIT B12 BLD-MCNC: 1674 PG/ML (ref 211–946)
WBC NRBC COR # BLD: 7.46 10*3/MM3 (ref 4.5–10.7)

## 2018-05-25 PROCEDURE — 87040 BLOOD CULTURE FOR BACTERIA: CPT | Performed by: PHYSICIAN ASSISTANT

## 2018-05-25 PROCEDURE — 93306 TTE W/DOPPLER COMPLETE: CPT | Performed by: INTERNAL MEDICINE

## 2018-05-25 PROCEDURE — A9577 INJ MULTIHANCE: HCPCS | Performed by: INTERNAL MEDICINE

## 2018-05-25 PROCEDURE — 70544 MR ANGIOGRAPHY HEAD W/O DYE: CPT

## 2018-05-25 PROCEDURE — 99285 EMERGENCY DEPT VISIT HI MDM: CPT

## 2018-05-25 PROCEDURE — G0378 HOSPITAL OBSERVATION PER HR: HCPCS

## 2018-05-25 PROCEDURE — 70553 MRI BRAIN STEM W/O & W/DYE: CPT

## 2018-05-25 PROCEDURE — 36415 COLL VENOUS BLD VENIPUNCTURE: CPT | Performed by: INTERNAL MEDICINE

## 2018-05-25 PROCEDURE — 85610 PROTHROMBIN TIME: CPT | Performed by: PHYSICIAN ASSISTANT

## 2018-05-25 PROCEDURE — 81003 URINALYSIS AUTO W/O SCOPE: CPT | Performed by: PHYSICIAN ASSISTANT

## 2018-05-25 PROCEDURE — 83605 ASSAY OF LACTIC ACID: CPT | Performed by: PHYSICIAN ASSISTANT

## 2018-05-25 PROCEDURE — 85730 THROMBOPLASTIN TIME PARTIAL: CPT | Performed by: PHYSICIAN ASSISTANT

## 2018-05-25 PROCEDURE — 70450 CT HEAD/BRAIN W/O DYE: CPT

## 2018-05-25 PROCEDURE — 93306 TTE W/DOPPLER COMPLETE: CPT

## 2018-05-25 PROCEDURE — 84484 ASSAY OF TROPONIN QUANT: CPT | Performed by: PHYSICIAN ASSISTANT

## 2018-05-25 PROCEDURE — 82607 VITAMIN B-12: CPT | Performed by: INTERNAL MEDICINE

## 2018-05-25 PROCEDURE — 70549 MR ANGIOGRAPH NECK W/O&W/DYE: CPT

## 2018-05-25 PROCEDURE — 93005 ELECTROCARDIOGRAM TRACING: CPT | Performed by: PHYSICIAN ASSISTANT

## 2018-05-25 PROCEDURE — 85025 COMPLETE CBC W/AUTO DIFF WBC: CPT | Performed by: PHYSICIAN ASSISTANT

## 2018-05-25 PROCEDURE — 80053 COMPREHEN METABOLIC PANEL: CPT | Performed by: PHYSICIAN ASSISTANT

## 2018-05-25 PROCEDURE — 83036 HEMOGLOBIN GLYCOSYLATED A1C: CPT | Performed by: INTERNAL MEDICINE

## 2018-05-25 PROCEDURE — 36415 COLL VENOUS BLD VENIPUNCTURE: CPT

## 2018-05-25 PROCEDURE — 71046 X-RAY EXAM CHEST 2 VIEWS: CPT

## 2018-05-25 PROCEDURE — 93010 ELECTROCARDIOGRAM REPORT: CPT | Performed by: INTERNAL MEDICINE

## 2018-05-25 PROCEDURE — 84443 ASSAY THYROID STIM HORMONE: CPT | Performed by: INTERNAL MEDICINE

## 2018-05-25 PROCEDURE — 0 GADOBENATE DIMEGLUMINE 529 MG/ML SOLUTION: Performed by: INTERNAL MEDICINE

## 2018-05-25 PROCEDURE — 85610 PROTHROMBIN TIME: CPT | Performed by: INTERNAL MEDICINE

## 2018-05-25 RX ORDER — ONDANSETRON 4 MG/1
4 TABLET, FILM COATED ORAL EVERY 6 HOURS PRN
Status: DISCONTINUED | OUTPATIENT
Start: 2018-05-25 | End: 2018-05-26 | Stop reason: HOSPADM

## 2018-05-25 RX ORDER — CALCIUM CARBONATE 200(500)MG
2 TABLET,CHEWABLE ORAL 2 TIMES DAILY PRN
Status: DISCONTINUED | OUTPATIENT
Start: 2018-05-25 | End: 2018-05-26 | Stop reason: HOSPADM

## 2018-05-25 RX ORDER — BISACODYL 5 MG/1
5 TABLET, DELAYED RELEASE ORAL DAILY PRN
Status: DISCONTINUED | OUTPATIENT
Start: 2018-05-25 | End: 2018-05-26 | Stop reason: HOSPADM

## 2018-05-25 RX ORDER — MULTIPLE VITAMINS W/ MINERALS TAB 9MG-400MCG
1 TAB ORAL DAILY
COMMUNITY

## 2018-05-25 RX ORDER — ATORVASTATIN CALCIUM 80 MG/1
80 TABLET, FILM COATED ORAL NIGHTLY
Status: DISCONTINUED | OUTPATIENT
Start: 2018-05-25 | End: 2018-05-26 | Stop reason: HOSPADM

## 2018-05-25 RX ORDER — SODIUM CHLORIDE 0.9 % (FLUSH) 0.9 %
10 SYRINGE (ML) INJECTION AS NEEDED
Status: DISCONTINUED | OUTPATIENT
Start: 2018-05-25 | End: 2018-05-26 | Stop reason: HOSPADM

## 2018-05-25 RX ORDER — WARFARIN SODIUM 2 MG/1
7 TABLET ORAL
COMMUNITY

## 2018-05-25 RX ORDER — NITROGLYCERIN 0.4 MG/1
0.4 TABLET SUBLINGUAL
Status: DISCONTINUED | OUTPATIENT
Start: 2018-05-25 | End: 2018-05-26 | Stop reason: HOSPADM

## 2018-05-25 RX ORDER — CLOBETASOL PROPIONATE 0.5 MG/G
1 AEROSOL, FOAM TOPICAL 2 TIMES DAILY PRN
COMMUNITY

## 2018-05-25 RX ORDER — WARFARIN SODIUM 2 MG/1
8 TABLET ORAL 3 TIMES WEEKLY
COMMUNITY
End: 2019-02-25 | Stop reason: SDUPTHER

## 2018-05-25 RX ORDER — ONDANSETRON 2 MG/ML
4 INJECTION INTRAMUSCULAR; INTRAVENOUS EVERY 6 HOURS PRN
Status: DISCONTINUED | OUTPATIENT
Start: 2018-05-25 | End: 2018-05-26 | Stop reason: HOSPADM

## 2018-05-25 RX ORDER — ONDANSETRON 4 MG/1
4 TABLET, ORALLY DISINTEGRATING ORAL EVERY 6 HOURS PRN
Status: DISCONTINUED | OUTPATIENT
Start: 2018-05-25 | End: 2018-05-26 | Stop reason: HOSPADM

## 2018-05-25 RX ORDER — AMOXICILLIN 250 MG/1
500 CAPSULE ORAL EVERY 12 HOURS SCHEDULED
Status: DISCONTINUED | OUTPATIENT
Start: 2018-05-25 | End: 2018-05-26 | Stop reason: HOSPADM

## 2018-05-25 RX ORDER — SODIUM CHLORIDE 0.9 % (FLUSH) 0.9 %
1-10 SYRINGE (ML) INJECTION AS NEEDED
Status: DISCONTINUED | OUTPATIENT
Start: 2018-05-25 | End: 2018-05-26 | Stop reason: HOSPADM

## 2018-05-25 RX ORDER — AMOXICILLIN 500 MG/1
500 CAPSULE ORAL 2 TIMES DAILY
COMMUNITY
End: 2018-06-28

## 2018-05-25 RX ORDER — TRIAMTERENE AND HYDROCHLOROTHIAZIDE 37.5; 25 MG/1; MG/1
1 CAPSULE ORAL EVERY MORNING
Status: DISCONTINUED | OUTPATIENT
Start: 2018-05-26 | End: 2018-05-26 | Stop reason: HOSPADM

## 2018-05-25 RX ORDER — FEXOFENADINE HCL 180 MG/1
180 TABLET ORAL DAILY PRN
COMMUNITY

## 2018-05-25 RX ADMIN — AMOXICILLIN 500 MG: 250 CAPSULE ORAL at 21:24

## 2018-05-25 RX ADMIN — GADOBENATE DIMEGLUMINE 18 ML: 529 INJECTION, SOLUTION INTRAVENOUS at 17:30

## 2018-05-25 RX ADMIN — ATORVASTATIN CALCIUM 80 MG: 80 TABLET, FILM COATED ORAL at 21:24

## 2018-05-25 RX ADMIN — WARFARIN SODIUM 7 MG: 6 TABLET ORAL at 21:24

## 2018-05-25 NOTE — ED PROVIDER NOTES
Pt presents to the ED c/o confusion described as disorientation which began this morning.  Pt reports he feels 'uneasy' but denies any other complaints at this time. Pt is able to recollect the events from yesterday except is slow to remember and report seeing Dr. Pierson for evaluation of a hernia yesterday.  Pt denies any pain, vision disturbance, or difficulty speaking.      Pt's fiance reports the events from this morning.  She states the pt was unaware about the hernia and forgot about visiting Dr. Pierson yesterday.  She states the pt was confused about events and repetitive in speaking.  Family states the pt has no hx of migraines or seizures.   The only new med the pt has taken is amoxicillin 2 days ago.  Pt is anticoagulated on Coumadin for a DVT which occurred in 2005 and a positive anticardiolipin antibody      On exam, pt's memory is improving.  Pt is A+Ox3 and in NAD.  Pt's head is normocephalic and atraumatic, eyes have PERRL, and mouth has moist mucus membranes. Pt's neck is supple and skin has no rash.  On neuro exam, pt's sensation and motor is intact.     EKG           EKG time: 1115  Rhythm/Rate: NSR, 98  P waves and SC: normal  QRS, axis: narrow QRS borderline L axis deviation  ST and T waves: diffuse, nonspecific changes     Interpreted Contemporaneously by me, independently viewed  Unchanged compared to prior 1/2018.    Discussed this sounds like global transient amnesia.  Discussed plan to perform workup for further evaluation and pt will likely be admitted.      1435  Rechecked pt, who is resting comfortably.  PT is back to his normal self.  Dr. Shine ([facility]) plans to admit the pt and has evaluated the pt in the ER.  Called daughter and notified her of workup and plan for admission.  Patient is back to his normal self    MD ATTESTATION NOTE    The IRIS and I have discussed this patient's history, physical exam, and treatment plan.  I have reviewed the documentation and personally had a face to  face interaction with the patient. I affirm the documentation and agree with the treatment and plan.  The attached note describes my personal findings.    Documentation assistance provided by jackeline Callahan for Dr. Sánchez. Information recorded by the [facility] was done at my direction and has been verified and validated by me.          Alejandra Callahan  05/25/18 1136       Alejandra Callahan  05/25/18 1436       Ken Sánchez MD  05/25/18 9833

## 2018-05-25 NOTE — H&P
Name: Ajith Dimas ADMIT: 2018   : 1945  PCP: Jamal Bah MD    MRN: [id number] LOS: 0 days   AGE/SEX: 73 y.o. male  ROOM:      Chief Complaint   Patient presents with   • Altered Mental Status     seen at pmd yest for cold sx.  given amox.  now forgetting recent events.  a/o x 4.       Subjective   Mr. Dimas is a 73 y.o. 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  The patient is able to provide some history with the help of his fiance at bedside.  Apparently everything was normal at about 9AM today when he and his fiance were drinking coffee.  He got up and went into the bathroom and a few seconds later he came right back out and started asking his fiance to look at his inguinal hernia as though he had never seen it-he had just seen Dr. Jose Pierson regarding this yesterday and had absolutely no recollection of this.  He additionally had difficulty recalling simple facts like the identity of the current president.  His wife, who is a retired nurse, states specifically that she noticed no dysarthria, facial droop, or focal weakness.  The patient has never had anything like this happen before.  He is nearly back to his baseline now but still has some difficulty with recollection of recent events.  He denies any recent life stressors.    The patient is on long-term coumadin for positive anticardiolipin antibody and a remote DVT history.  He keeps very close track of his INR in a spreadsheet, and he notes that he has been very consistently in the 2-2.5 range.      Of note, he reports having a cough for a few days and is on a course of amoxicillin per his PCP.      History of Present Illness    Past Medical History:   Diagnosis Date   • Anticardiolipin antibody positive    • Deep vein thrombosis    • Erectile dysfunction    • Hernia, inguinal    •  Hyperlipemia    • Hypertension      Past Surgical History:   Procedure Laterality Date   • CARDIAC CATHETERIZATION     • INGUINAL HERNIA REPAIR Right 1/29/2018    Procedure: RIGHT INGUINAL HERNIA REPAIR WITH MESH;  Surgeon: Jose Pierson Jr., MD;  Location: [facility];  Service:    • TONSILLECTOMY AND ADENOIDECTOMY     • UMBILICAL HERNIA REPAIR N/A 1/29/2018    Procedure: UMBILICAL HERNIA REPAIR WITH MESH;  Surgeon: Jose Pierson Jr., MD;  Location: [facility] OR;  Service:      Family History   Problem Relation Age of Onset   • Breast cancer Mother    • Colon polyps Father    • Melanoma Brother    • Prostate cancer Brother    • Malig Hyperthermia Neg Hx      Social History   Substance Use Topics   • Smoking status: Never Smoker   • Smokeless tobacco: Never Used   • Alcohol use 3.0 oz/week     5 Standard drinks or equivalent per week      Comment: per week       (Not in a hospital admission)  Allergies:  No Known Allergies    Review of Systems   Constitutional: Negative for appetite change, chills, diaphoresis, fatigue and fever.   HENT: Positive for congestion, rhinorrhea and sore throat. Negative for nosebleeds and trouble swallowing.    Eyes: Negative for photophobia, redness and visual disturbance.   Respiratory: Positive for cough. Negative for choking, chest tightness, shortness of breath and wheezing.    Cardiovascular: Negative for chest pain, palpitations and leg swelling.   Gastrointestinal: Negative for abdominal pain, blood in stool, constipation, diarrhea, nausea and vomiting.   Endocrine: Negative for cold intolerance and heat intolerance.   Genitourinary: Negative for difficulty urinating, dysuria and hematuria.   Musculoskeletal: Negative for arthralgias and myalgias.   Skin: Negative for pallor and rash.   Neurological: Negative for dizziness, tremors, seizures, syncope, facial asymmetry, speech difficulty, weakness, light-headedness, numbness and headaches.   Hematological: Negative for  adenopathy. Does not bruise/bleed easily.   Psychiatric/Behavioral: Negative for confusion and decreased concentration.        Objective    Vital Signs  Temp:  [99 °F (37.2 °C)] 99 °F (37.2 °C)  Heart Rate:  [80-96] 84  Resp:  [16-18] 16  BP: (121-172)/(71-96) 155/85  SpO2:  [92 %-97 %] 96 %  on   ;   Device (Oxygen Therapy): room air  Body mass index is 24.37 kg/m².    Physical Exam   Constitutional: He is oriented to person, place, and time. No distress.   HENT:   Head: Normocephalic and atraumatic.   Mouth/Throat: Oropharynx is clear and moist.   Mild erythema of the throat but no exudate   Eyes: Conjunctivae and EOM are normal. Pupils are equal, round, and reactive to light.   Neck: Normal range of motion. Neck supple.   Cardiovascular: Normal rate, regular rhythm and intact distal pulses.    Pulmonary/Chest: Effort normal and breath sounds normal.   Abdominal: Soft. Bowel sounds are normal.   Musculoskeletal: He exhibits no edema or tenderness.   Neurological: He is alert and oriented to person, place, and time. He has normal strength. No cranial nerve deficit. He displays no Babinski's sign on the right side.   Skin: Skin is warm and dry. No rash noted. He is not diaphoretic.   Psychiatric: He has a normal mood and affect. His behavior is normal.   Nursing note and vitals reviewed.      Results Review:   I reviewed the patient's new clinical results including all labs and xray's.    Lab Results (last 24 hours)     Procedure Component Value Units Date/Time    Comprehensive Metabolic Panel [[id number]]  (Abnormal) Collected:  05/25/18 1110    Specimen:  Blood Updated:  05/25/18 1147     Glucose 105 (H) mg/dL      BUN 25 (H) mg/dL      Creatinine 0.92 mg/dL      Sodium 140 mmol/L      Potassium 3.5 mmol/L      Chloride 99 mmol/L      CO2 27.6 mmol/L      Calcium 9.2 mg/dL      Total Protein 7.4 g/dL      Albumin 4.30 g/dL      ALT (SGPT) 19 U/L      AST (SGOT) 23 U/L      Alkaline Phosphatase 77 U/L      Total  Bilirubin 0.4 mg/dL      eGFR Non African Amer 81 mL/min/1.73      Globulin 3.1 gm/dL      A/G Ratio 1.4 g/dL      BUN/Creatinine Ratio 27.2 (H)     Anion Gap 13.4 mmol/L     Narrative:       The MDRD GFR formula is only valid for adults with stable renal function between ages 18 and 70.    CBC & Differential [[id number]] Collected:  05/25/18 1110    Specimen:  Blood Updated:  05/25/18 1124    Narrative:       The following orders were created for panel order CBC & Differential.  Procedure                               Abnormality         Status                     ---------                               -----------         ------                     CBC Auto Differential[[id number]]        Abnormal            Final result                 Please view results for these tests on the individual orders.    Protime-INR [[id number]]  (Abnormal) Collected:  05/25/18 1110    Specimen:  Blood Updated:  05/25/18 1134     Protime 22.3 (H) Seconds      INR 1.99 (H)    Troponin [[id number]]  (Normal) Collected:  05/25/18 1110    Specimen:  Blood Updated:  05/25/18 1146     Troponin T <0.010 ng/mL     Narrative:       Troponin T Reference Ranges:  Less than 0.03 ng/mL:    Negative for AMI  0.03 to 0.09 ng/mL:      Indeterminant for AMI  Greater than 0.09 ng/mL: Positive for AMI    aPTT [[id number]]  (Normal) Collected:  05/25/18 1110    Specimen:  Blood Updated:  05/25/18 1134     PTT 30.4 seconds     CBC Auto Differential [[id number]]  (Abnormal) Collected:  05/25/18 1110    Specimen:  Blood Updated:  05/25/18 1124     WBC 7.46 10*3/mm3      RBC 4.50 (L) 10*6/mm3      Hemoglobin 14.9 g/dL      Hematocrit 42.5 %      MCV 94.4 fL      MCH 33.1 (H) pg      MCHC 35.1 g/dL      RDW 11.9 %      RDW-SD 40.6 fl      MPV 10.9 fL      Platelets 183 10*3/mm3      Neutrophil % 72.3 %      Lymphocyte % 19.6 %      Monocyte % 6.7 %      Eosinophil % 1.1 %      Basophil % 0.3 %      Immature Grans % 0.1 %      Neutrophils, Absolute 5.40 10*3/mm3       Lymphocytes, Absolute 1.46 10*3/mm3      Monocytes, Absolute 0.50 10*3/mm3      Eosinophils, Absolute 0.08 10*3/mm3      Basophils, Absolute 0.02 10*3/mm3      Immature Grans, Absolute 0.01 10*3/mm3     Blood Culture - Blood, [[id number]] Collected:  05/25/18 1208    Specimen:  Blood from Hand, Right Updated:  05/25/18 1229    Blood Culture - Blood, [[id number]] Collected:  05/25/18 1216    Specimen:  Blood from Hand, Left Updated:  05/25/18 1231    Lactic Acid, Plasma [[id number]]  (Normal) Collected:  05/25/18 1216    Specimen:  Blood Updated:  05/25/18 1248     Lactate 1.1 mmol/L     Urinalysis With / Culture If Indicated - Urine, Clean Catch [[id number]]  (Abnormal) Collected:  05/25/18 1225    Specimen:  Urine from Urine, Clean Catch Updated:  05/25/18 1236     Color, UA Yellow     Appearance, UA Clear     pH, UA 5.5     Specific Gravity, UA 1.023     Glucose, UA Negative     Ketones, UA Negative     Bilirubin, UA Negative     Blood, UA Negative     Protein, UA Trace (A)     Leuk Esterase, UA Negative     Nitrite, UA Negative     Urobilinogen, UA 0.2 E.U./dL    Narrative:       Urine microscopic not indicated.          CT Head Without Contrast   Final Result   No evidence of hemorrhage. Mild vascular calcification is   noted. Decreased attenuation is appreciated involving the [place] to the   left of midline anteriorly, nonspecific. This may be artifactual.   Decreased attenuation related to a subacute or remote infarct cannot be   entirely excluded. Further evaluation could be performed with a MRI   examination of the brain as indicated.        The above information was called to and discussed with Sussy Ponce.       Radiation dose reduction techniques were utilized, including automated   exposure control and exposure modulation based on body size.       This report was finalized on 5/25/2018 1:31 PM by Dr. Viraj Donnelly M.D.          XR Chest 2 View   Final Result   No focal pulmonary consolidation. COPD  change. Tortuous   aorta. Follow-up as clinically indicated.       This report was finalized on 5/25/2018 12:13 PM by Dr. Dangelo Anderson M.D.          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   Transient Global Amnesia  - unsure of precipitating event but he seems to be returning to baseline  - neuro exam is non-focal  - head CT reviewed-?old infarct in [place]lin but probably artifact  - given his age, history of HTN, HLD, and hypercoagulability will go ahead and proceed with the stroke/TIA workup with MRI/MRA of the head and neck and echocardiogram with bubble study  - check lipids, B12, TSH  - monitor on telemetry, follow NIHSS  - will consult neurology    DVT Prophylaxis  - on AC with coumadin       I discussed the patients findings and my recommendations with patient, family and nursing staff.      Shaheen Shine MD  [facility]  05/25/18  2:46 PM

## 2018-05-25 NOTE — ED PROVIDER NOTES
EMERGENCY DEPARTMENT ENCOUNTER    Room Number:  36/36  Date seen:  5/25/2018  Time seen: 10:41 AM  PCP: Jamal Bah MD    HPI:  Chief complaint:Confusion  Context:Ajith Dimas is a 73 y.o. male who presents to the ED with c/o confusion described as forgetfullness of recent events that began this morning at 9am. Fiance states he was normal at breakfast, went to the bathroom, and began acting strange immediately upon returning. Patient was recently placed on amoxicillin two days ago due to recent cough/congestion. Denies h/o similar sx. Patient also had generalized weakness. Denies fever, chills, N/V/D, abd pain, numbness, focal weakness, HA, or any other sx. Patient has been on Coumadin due to h/o DVT (last in 2005) and for positive anticardiolipin antibody.    Onset: gradual  Location:Neuro  Radiation: none  Duration: this morning  Timing: constant  Character:confusion  Aggravating Factors: none  Alleviating Factors: none  Severity: moderate    ALLERGIES  Patient has no known allergies.    PAST MEDICAL HISTORY  Active Ambulatory Problems     Diagnosis Date Noted   • Right inguinal hernia 12/28/2017   • Left inguinal hernia 05/24/2018     Resolved Ambulatory Problems     Diagnosis Date Noted   • No Resolved Ambulatory Problems     Past Medical History:   Diagnosis Date   • Anticardiolipin antibody positive    • Deep vein thrombosis    • Erectile dysfunction    • Hernia, inguinal    • Hyperlipemia    • Hypertension        PAST SURGICAL HISTORY  Past Surgical History:   Procedure Laterality Date   • CARDIAC CATHETERIZATION     • INGUINAL HERNIA REPAIR Right 1/29/2018    Procedure: RIGHT INGUINAL HERNIA REPAIR WITH MESH;  Surgeon: Jose Pierson Jr., MD;  Location: [facility];  Service:    • TONSILLECTOMY AND ADENOIDECTOMY     • UMBILICAL HERNIA REPAIR N/A 1/29/2018    Procedure: UMBILICAL HERNIA REPAIR WITH MESH;  Surgeon: Jose Pierson Jr., MD;  Location: [facility];  Service:        FAMILY  HISTORY  Family History   Problem Relation Age of Onset   • Breast cancer Mother    • Colon polyps Father    • Melanoma Brother    • Prostate cancer Brother    • Malig Hyperthermia Neg Hx        SOCIAL HISTORY  Social History     Social History   • Marital status:      Spouse name: N/A   • Number of children: N/A   • Years of education: N/A     Occupational History   • sales [employer]      Social History Main Topics   • Smoking status: Never Smoker   • Smokeless tobacco: Never Used   • Alcohol use 3.0 oz/week     5 Standard drinks or equivalent per week      Comment: per week   • Drug use: Unknown   • Sexual activity: Defer     Other Topics Concern   • Not on file     Social History Narrative   • No narrative on file       REVIEW OF SYSTEMS  Review of Systems   Constitutional: Negative for chills and fever.   HENT: Negative.    Eyes: Negative.    Respiratory: Negative for shortness of breath.    Cardiovascular: Negative for chest pain.   Gastrointestinal: Negative for abdominal pain.   Genitourinary: Negative.    Musculoskeletal: Negative.    Skin: Negative.    Neurological: Positive for weakness (generalized). Negative for syncope, numbness and headaches.   Psychiatric/Behavioral: Positive for confusion.       PHYSICAL EXAM  ED Triage Vitals [05/25/18 1025]   Temp Heart Rate Resp BP SpO2   99 °F (37.2 °C) 96 18 147/87 97 %     Physical Exam   Constitutional: He is oriented to person, place, and time and well-developed, well-nourished, and in no distress.   HENT:   Head: Normocephalic and atraumatic.   Right Ear: External ear normal.   Left Ear: External ear normal.   Nose: Nose normal.   Eyes: Conjunctivae are normal.   Neck: Normal range of motion.   Cardiovascular: Normal rate and regular rhythm.    Pulmonary/Chest: Effort normal and breath sounds normal.   Musculoskeletal: Normal range of motion.   Sensation is intact to light touch throughout the bilateral lower extremities. Muscle strength is 5/5 and  symmetrical with plantarflexion and EHL. Achilles and patellar reflexes are 2+ and equal bilaterally. DP and PT pulses are 2+ bilaterally.    Neurological: He is alert and oriented to person, place, and time.   GCS is 15  Cranial nerves II-XII are intact.  No facial droop. Uvula is midline. No tongue deviation. PERRL and EOMI. There is no dysarthria, aphasia or slurred speech.  Sensation is intact to light touch bilaterally and is symmetrical in the face, BUE and BLE.  Strength is 5/5 and symmetrical in the BUE and BLE.  Finger to nose, heel to shin, and rapid alternating movements are intact.   Skin: Skin is warm and dry.   Psychiatric: Affect normal.   Nursing note and vitals reviewed.      LAB RESULTS  Recent Results (from the past 24 hour(s))   [facility]    Collection Time: 05/25/18 11:10 AM   Result Value Ref Range    Extra Tube Hold for add-ons.    Comprehensive Metabolic Panel    Collection Time: 05/25/18 11:10 AM   Result Value Ref Range    Glucose 105 (H) 65 - 99 mg/dL    BUN 25 (H) 8 - 23 mg/dL    Creatinine 0.92 0.76 - 1.27 mg/dL    Sodium 140 136 - 145 mmol/L    Potassium 3.5 3.5 - 5.2 mmol/L    Chloride 99 98 - 107 mmol/L    CO2 27.6 22.0 - 29.0 mmol/L    Calcium 9.2 8.6 - 10.5 mg/dL    Total Protein 7.4 6.0 - 8.5 g/dL    Albumin 4.30 3.50 - 5.20 g/dL    ALT (SGPT) 19 1 - 41 U/L    AST (SGOT) 23 1 - 40 U/L    Alkaline Phosphatase 77 39 - 117 U/L    Total Bilirubin 0.4 0.1 - 1.2 mg/dL    eGFR Non African Amer 81 >60 mL/min/1.73    Globulin 3.1 gm/dL    A/G Ratio 1.4 g/dL    BUN/Creatinine Ratio 27.2 (H) 7.0 - 25.0    Anion Gap 13.4 mmol/L   Protime-INR    Collection Time: 05/25/18 11:10 AM   Result Value Ref Range    Protime 22.3 (H) 11.7 - 14.2 Seconds    INR 1.99 (H) 0.90 - 1.10   Troponin    Collection Time: 05/25/18 11:10 AM   Result Value Ref Range    Troponin T <0.010 0.000 - 0.030 ng/mL   aPTT    Collection Time: 05/25/18 11:10 AM   Result Value Ref Range    PTT 30.4 22.7 - 35.4 seconds   CBC  Auto Differential    Collection Time: 05/25/18 11:10 AM   Result Value Ref Range    WBC 7.46 4.50 - 10.70 10*3/mm3    RBC 4.50 (L) 4.60 - 6.00 10*6/mm3    Hemoglobin 14.9 13.7 - 17.6 g/dL    Hematocrit 42.5 40.4 - 52.2 %    MCV 94.4 79.8 - 96.2 fL    MCH 33.1 (H) 27.0 - 32.7 pg    MCHC 35.1 32.6 - 36.4 g/dL    RDW 11.9 11.5 - 14.5 %    RDW-SD 40.6 37.0 - 54.0 fl    MPV 10.9 6.0 - 12.0 fL    Platelets 183 140 - 500 10*3/mm3    Neutrophil % 72.3 42.7 - 76.0 %    Lymphocyte % 19.6 19.6 - 45.3 %    Monocyte % 6.7 5.0 - 12.0 %    Eosinophil % 1.1 0.3 - 6.2 %    Basophil % 0.3 0.0 - 1.5 %    Immature Grans % 0.1 0.0 - 0.5 %    Neutrophils, Absolute 5.40 1.90 - 8.10 10*3/mm3    Lymphocytes, Absolute 1.46 0.90 - 4.80 10*3/mm3    Monocytes, Absolute 0.50 0.20 - 1.20 10*3/mm3    Eosinophils, Absolute 0.08 0.00 - 0.70 10*3/mm3    Basophils, Absolute 0.02 0.00 - 0.20 10*3/mm3    Immature Grans, Absolute 0.01 0.00 - 0.03 10*3/mm3   Lactic Acid, Plasma    Collection Time: 05/25/18 12:16 PM   Result Value Ref Range    Lactate 1.1 0.5 - 2.0 mmol/L   Urinalysis With / Culture If Indicated - Urine, Clean Catch    Collection Time: 05/25/18 12:25 PM   Result Value Ref Range    Color, UA Yellow Yellow, Straw    Appearance, UA Clear Clear    pH, UA 5.5 5.0 - 8.0    Specific Gravity, UA 1.023 1.005 - 1.030    Glucose, UA Negative Negative    Ketones, UA Negative Negative    Bilirubin, UA Negative Negative    Blood, UA Negative Negative    Protein, UA Trace (A) Negative    Leuk Esterase, UA Negative Negative    Nitrite, UA Negative Negative    Urobilinogen, UA 0.2 E.U./dL 0.2 - 1.0 E.U./dL       I ordered the above labs and reviewed the results    RADIOLOGY  CT Head Without Contrast   Final Result   No evidence of hemorrhage. Mild vascular calcification is   noted. Decreased attenuation is appreciated involving the franklin to the   left of midline anteriorly, nonspecific. This may be artifactual.   Decreased attenuation related to a subacute  or remote infarct cannot be   entirely excluded. Further evaluation could be performed with a MRI   examination of the brain as indicated.        The above information was called to and discussed with Sussy Ponce.       Radiation dose reduction techniques were utilized, including automated   exposure control and exposure modulation based on body size.       This report was finalized on 5/25/2018 1:31 PM by Dr. Viraj Donnelly M.D.          XR Chest 2 View   Final Result   No focal pulmonary consolidation. COPD change. Tortuous   aorta. Follow-up as clinically indicated.       This report was finalized on 5/25/2018 12:13 PM by Dr. Dangelo Anderson M.D.              I ordered the above noted radiological studies and reviewed the images on the PACS system.  Spoke with Dr. Donnelly regarding CT/MRI scan results who states the patient has a small area of decreased attenuation in the L franklin that is probably artifcat but cannot exclude an infarct, If there is an infarct, this is nonacute.    MEDICATIONS GIVEN IN ER  Medications   sodium chloride 0.9 % flush 10 mL (not administered)       EKG  Interpreted by ED Physician    PROCEDURES  Procedures      PROGRESS AND CONSULTS    Progress Notes:       1055: I discussed plan for Radiological and lab workup in the ED. Discuss plan for admission based on presentation of his sx. Pt understands and agrees with the plan, all questions answered.    1100 Reviewed pt's history and workup with Dr. Ken Sánchez.  After a bedside evaluation; Dr Sánchez agrees with the plan of care    1315: Rechecked pt. I discussed lab results and radiological studies with the patient and the family. Discussed consultation to the hospitalist that has been placed. Family states that the patient has had continued amnesia in the ED, but states that personality wise that he is at baseline. Pt understands and agrees with the plan, all questions answered..    1340: Discussed case with Dr. Shaheen Shine,  "A hospitalist concerning the patient and the findings in the ED. Dr. Shine requests admittance to [facility].       Disposition vitals:  /71   Pulse 80   Temp 99 °F (37.2 °C) (Tympanic)   Resp 16   Ht 190.5 cm (75\")   Wt 88.5 kg (195 lb)   SpO2 92%   BMI 24.37 kg/m²       DIAGNOSIS  Final diagnoses:   Transient global amnesia     ADMISSION    Discussed treatment plan and reason for admission with pt/family and admitting physician.  Pt/family voiced understanding of the plan for admission for further testing/treatment as needed.     Documentation assistance provided by jackeline Baker for Sussy Ponce PA-C.  Information recorded by the scribe was done at my direction and has been verified and validated by me.      Arcelia Baker  05/25/18 1358       BRIELLE Jones  05/26/18 1925    "

## 2018-05-25 NOTE — PROGRESS NOTES
Clinical Pharmacy Services: Medication History    Ajith Dimas is a 73 y.o. male presenting to [facility] for   Chief Complaint   Patient presents with   • Altered Mental Status     seen at pmd yest for cold sx.  given amox.  now forgetting recent events.  a/o x 4.       He  has a past medical history of Anticardiolipin antibody positive; Deep vein thrombosis; Erectile dysfunction; Hernia, inguinal; Hyperlipemia; and Hypertension.    Allergies as of 05/25/2018   • (No Known Allergies)       Medication information was obtained from: Patient  Pharmacy and Phone Number: [facility] [phone]    Prior to Admission Medications     Prescriptions Last Dose Informant Patient Reported? Taking?    amoxicillin (AMOXIL) 500 MG capsule 5/25/2018 Self Yes Yes    Take 500 mg by mouth 2 (Two) Times a Day.    clobetasol (OLUX) 0.05 % topical foam  Self Yes Yes    Apply 1 application topically 2 (Two) Times a Day As Needed.    fexofenadine (ALLEGRA) 180 MG tablet Past Week Self Yes Yes    Take 180 mg by mouth Daily As Needed.    Multiple Vitamins-Minerals (MULTIVITAMIN WITH MINERALS) tablet tablet 5/24/2018 Self Yes Yes    Take 1 tablet by mouth Daily.    simvastatin (ZOCOR) 20 MG tablet 5/24/2018 Self Yes Yes    Take 20 mg by mouth Every Night.    tadalafil (CIALIS) 5 MG tablet  Self Yes Yes    Take 5 mg by mouth Daily As Needed for erectile dysfunction.    triamterene-hydrochlorothiazide (DYAZIDE) 37.5-25 MG per capsule 5/24/2018 Self Yes Yes    Take 1 capsule by mouth Every Morning.    warfarin (COUMADIN) 2 MG tablet 5/24/2018 Self Yes Yes    Take 8 mg by mouth 4 (Four) Times a Week. Sun, Tues, Thurs, Sat    warfarin (COUMADIN) 2 MG tablet 5/23/2018 Self Yes Yes    Take 7 mg by mouth 3 (Three) Times a Week. Mon, Wed, Fri            Medication notes: Added: Clobetasol, Multivitamin    This medication list is complete to the best of my knowledge as of 5/25/2018    Please call if questions.    Sonia Davidson,  Medication History Technician  5/25/2018 3:08 PM

## 2018-05-26 VITALS
SYSTOLIC BLOOD PRESSURE: 117 MMHG | TEMPERATURE: 97.8 F | OXYGEN SATURATION: 99 % | HEIGHT: 75 IN | HEART RATE: 79 BPM | WEIGHT: 198.5 LBS | DIASTOLIC BLOOD PRESSURE: 73 MMHG | BODY MASS INDEX: 24.68 KG/M2 | RESPIRATION RATE: 18 BRPM

## 2018-05-26 LAB
BH CV ECHO MEAS - ACS: 1.8 CM
BH CV ECHO MEAS - AO MAX PG (FULL): 1.8 MMHG
BH CV ECHO MEAS - AO MAX PG: 5.5 MMHG
BH CV ECHO MEAS - AO MEAN PG (FULL): 1 MMHG
BH CV ECHO MEAS - AO MEAN PG: 3 MMHG
BH CV ECHO MEAS - AO ROOT AREA (BSA CORRECTED): 1.4
BH CV ECHO MEAS - AO ROOT AREA: 7.1 CM^2
BH CV ECHO MEAS - AO ROOT DIAM: 3 CM
BH CV ECHO MEAS - AO V2 MAX: 117 CM/SEC
BH CV ECHO MEAS - AO V2 MEAN: 77.5 CM/SEC
BH CV ECHO MEAS - AO V2 VTI: 22.5 CM
BH CV ECHO MEAS - AVA(I,A): 3.1 CM^2
BH CV ECHO MEAS - AVA(I,D): 3.1 CM^2
BH CV ECHO MEAS - AVA(V,A): 3.1 CM^2
BH CV ECHO MEAS - AVA(V,D): 3.1 CM^2
BH CV ECHO MEAS - BSA(HAYCOCK): 2.2 M^2
BH CV ECHO MEAS - BSA: 2.2 M^2
BH CV ECHO MEAS - BZI_BMI: 24.7 KILOGRAMS/M^2
BH CV ECHO MEAS - BZI_METRIC_HEIGHT: 190 CM
BH CV ECHO MEAS - BZI_METRIC_WEIGHT: 89 KG
BH CV ECHO MEAS - CONTRAST EF (2CH): 62.5 ML/M^2
BH CV ECHO MEAS - CONTRAST EF 4CH: 65.7 ML/M^2
BH CV ECHO MEAS - EDV(CUBED): 29.8 ML
BH CV ECHO MEAS - EDV(MOD-SP2): 56 ML
BH CV ECHO MEAS - EDV(MOD-SP4): 102 ML
BH CV ECHO MEAS - EDV(TEICH): 37.9 ML
BH CV ECHO MEAS - EF(CUBED): 68.9 %
BH CV ECHO MEAS - EF(MOD-BP): 63.8 %
BH CV ECHO MEAS - EF(MOD-SP2): 62.5 %
BH CV ECHO MEAS - EF(MOD-SP4): 65.7 %
BH CV ECHO MEAS - EF(TEICH): 62 %
BH CV ECHO MEAS - ESV(CUBED): 9.3 ML
BH CV ECHO MEAS - ESV(MOD-SP2): 21 ML
BH CV ECHO MEAS - ESV(MOD-SP4): 35 ML
BH CV ECHO MEAS - ESV(TEICH): 14.4 ML
BH CV ECHO MEAS - FS: 32.3 %
BH CV ECHO MEAS - IVS/LVPW: 1
BH CV ECHO MEAS - IVSD: 1.1 CM
BH CV ECHO MEAS - LAT PEAK E' VEL: 12.4 CM/SEC
BH CV ECHO MEAS - LV DIASTOLIC VOL/BSA (35-75): 47 ML/M^2
BH CV ECHO MEAS - LV MASS(C)D: 99.7 GRAMS
BH CV ECHO MEAS - LV MASS(C)DI: 45.9 GRAMS/M^2
BH CV ECHO MEAS - LV MAX PG: 3.7 MMHG
BH CV ECHO MEAS - LV MEAN PG: 2 MMHG
BH CV ECHO MEAS - LV SYSTOLIC VOL/BSA (12-30): 16.1 ML/M^2
BH CV ECHO MEAS - LV V1 MAX: 96 CM/SEC
BH CV ECHO MEAS - LV V1 MEAN: 56.4 CM/SEC
BH CV ECHO MEAS - LV V1 VTI: 18.4 CM
BH CV ECHO MEAS - LVIDD: 3.1 CM
BH CV ECHO MEAS - LVIDS: 2.1 CM
BH CV ECHO MEAS - LVLD AP2: 7.3 CM
BH CV ECHO MEAS - LVLD AP4: 7.9 CM
BH CV ECHO MEAS - LVLS AP2: 6.8 CM
BH CV ECHO MEAS - LVLS AP4: 6.1 CM
BH CV ECHO MEAS - LVOT AREA (M): 3.8 CM^2
BH CV ECHO MEAS - LVOT AREA: 3.8 CM^2
BH CV ECHO MEAS - LVOT DIAM: 2.2 CM
BH CV ECHO MEAS - LVPWD: 1.1 CM
BH CV ECHO MEAS - MED PEAK E' VEL: 6.74 CM/SEC
BH CV ECHO MEAS - MV A DUR: 0.12 SEC
BH CV ECHO MEAS - MV A MAX VEL: 65 CM/SEC
BH CV ECHO MEAS - MV DEC SLOPE: 170 CM/SEC^2
BH CV ECHO MEAS - MV DEC TIME: 0.19 SEC
BH CV ECHO MEAS - MV E MAX VEL: 42.7 CM/SEC
BH CV ECHO MEAS - MV E/A: 0.66
BH CV ECHO MEAS - MV MAX PG: 3 MMHG
BH CV ECHO MEAS - MV MEAN PG: 1 MMHG
BH CV ECHO MEAS - MV P1/2T MAX VEL: 60.8 CM/SEC
BH CV ECHO MEAS - MV P1/2T: 104.8 MSEC
BH CV ECHO MEAS - MV V2 MAX: 86.2 CM/SEC
BH CV ECHO MEAS - MV V2 MEAN: 49.5 CM/SEC
BH CV ECHO MEAS - MV V2 VTI: 19.7 CM
BH CV ECHO MEAS - MVA P1/2T LCG: 3.6 CM^2
BH CV ECHO MEAS - MVA(P1/2T): 2.1 CM^2
BH CV ECHO MEAS - MVA(VTI): 3.6 CM^2
BH CV ECHO MEAS - PA ACC TIME: 0.1 SEC
BH CV ECHO MEAS - PA MAX PG (FULL): 1.2 MMHG
BH CV ECHO MEAS - PA MAX PG: 3.7 MMHG
BH CV ECHO MEAS - PA PR(ACCEL): 33.1 MMHG
BH CV ECHO MEAS - PA V2 MAX: 95.6 CM/SEC
BH CV ECHO MEAS - PULM A REVS DUR: 0.15 SEC
BH CV ECHO MEAS - PULM A REVS VEL: 50.9 CM/SEC
BH CV ECHO MEAS - PULM DIAS VEL: 48 CM/SEC
BH CV ECHO MEAS - PULM S/D: 1.3
BH CV ECHO MEAS - PULM SYS VEL: 62.8 CM/SEC
BH CV ECHO MEAS - PVA(V,A): 1.8 CM^2
BH CV ECHO MEAS - PVA(V,D): 1.8 CM^2
BH CV ECHO MEAS - QP/QS: 0.43
BH CV ECHO MEAS - RV MAX PG: 2.4 MMHG
BH CV ECHO MEAS - RV MEAN PG: 1 MMHG
BH CV ECHO MEAS - RV V1 MAX: 77.6 CM/SEC
BH CV ECHO MEAS - RV V1 MEAN: 45.5 CM/SEC
BH CV ECHO MEAS - RV V1 VTI: 13.4 CM
BH CV ECHO MEAS - RVOT AREA: 2.3 CM^2
BH CV ECHO MEAS - RVOT DIAM: 1.7 CM
BH CV ECHO MEAS - SI(AO): 73.2 ML/M^2
BH CV ECHO MEAS - SI(CUBED): 9.4 ML/M^2
BH CV ECHO MEAS - SI(LVOT): 32.2 ML/M^2
BH CV ECHO MEAS - SI(MOD-SP2): 16.1 ML/M^2
BH CV ECHO MEAS - SI(MOD-SP4): 30.8 ML/M^2
BH CV ECHO MEAS - SI(TEICH): 10.8 ML/M^2
BH CV ECHO MEAS - SV(AO): 159 ML
BH CV ECHO MEAS - SV(CUBED): 20.5 ML
BH CV ECHO MEAS - SV(LVOT): 69.9 ML
BH CV ECHO MEAS - SV(MOD-SP2): 35 ML
BH CV ECHO MEAS - SV(MOD-SP4): 67 ML
BH CV ECHO MEAS - SV(RVOT): 30.4 ML
BH CV ECHO MEAS - SV(TEICH): 23.5 ML
BH CV ECHO MEAS - TAPSE (>1.6): 2.47 CM2
BH CV ECHO MEASUREMENTS AVERAGE E/E' RATIO: 4.46
BH CV VAS BP RIGHT ARM: NORMAL MMHG
BH CV XLRA - RV BASE: 3.16 CM
BH CV XLRA - TDI S': 11.5 CM/SEC
CHOLEST SERPL-MCNC: 134 MG/DL (ref 0–200)
HBA1C MFR BLD: 5.1 % (ref 4.8–5.6)
HDLC SERPL-MCNC: 32 MG/DL (ref 40–60)
INR PPP: 1.86 (ref 0.9–1.1)
LDLC SERPL CALC-MCNC: 85 MG/DL (ref 0–100)
LDLC/HDLC SERPL: 2.65 {RATIO}
LEFT ATRIUM VOLUME INDEX: 14 ML/M2
LV EF 2D ECHO EST: 64 %
MAXIMAL PREDICTED HEART RATE: 149 BPM
PROTHROMBIN TIME: 21.1 SECONDS (ref 11.7–14.2)
STRESS TARGET HR: 127 BPM
TRIGL SERPL-MCNC: 86 MG/DL (ref 0–150)
VLDLC SERPL-MCNC: 17.2 MG/DL (ref 5–40)

## 2018-05-26 PROCEDURE — G0378 HOSPITAL OBSERVATION PER HR: HCPCS

## 2018-05-26 PROCEDURE — 80061 LIPID PANEL: CPT | Performed by: INTERNAL MEDICINE

## 2018-05-26 PROCEDURE — 85610 PROTHROMBIN TIME: CPT | Performed by: INTERNAL MEDICINE

## 2018-05-26 PROCEDURE — 99204 OFFICE O/P NEW MOD 45 MIN: CPT | Performed by: PSYCHIATRY & NEUROLOGY

## 2018-05-26 RX ADMIN — AMOXICILLIN 500 MG: 250 CAPSULE ORAL at 08:59

## 2018-05-26 RX ADMIN — TRIAMTERENE AND HYDROCHLOROTHIAZIDE 1 CAPSULE: 37.5; 25 CAPSULE ORAL at 07:10

## 2018-05-26 NOTE — PLAN OF CARE
Problem: Patient Care Overview  Goal: Plan of Care Review  Outcome: Ongoing (interventions implemented as appropriate)   05/26/18 0350   Coping/Psychosocial   Plan of Care Reviewed With patient   Plan of Care Review   Progress improving   OTHER   Outcome Summary VSS, A&OX4, NIH=0, no c/o pain or soa, MRI and Echo complete, Neuro to see in am, will continue to monitor

## 2018-05-26 NOTE — PLAN OF CARE
Problem: Stroke (Ischemic) (Adult)  Goal: Signs and Symptoms of Listed Potential Problems Will be Absent, Minimized or Managed (Stroke)  Outcome: Outcome(s) achieved Date Met: 05/25/18      Problem: Patient Care Overview  Goal: Plan of Care Review  Outcome: Ongoing (interventions implemented as appropriate)   05/25/18 2002   Coping/Psychosocial   Plan of Care Reviewed With patient   Plan of Care Review   Progress improving   OTHER   Outcome Summary Pt admitted for transient global amnesia. VSS. symptoms seem to have completely resolved by the time he got to the floor. NIH=0. CT, MRI's and ECHO done. waiting for darin to see. Will monitor.

## 2018-05-26 NOTE — CONSULTS
Patient Identification:  NAME:  Ajith Dimas  Age:  73 y.o.   Sex:  male   :  1945   MRN:  [id number]       Chief complaint: I don't remember, reason for consult episode of amnesia    History of present illness:  Is patient is 73-year-old right-handed white male with history of hyperlipidemia hypertension deep vein thrombosis on Coumadin anticardiolipin antibody syndrome who comes to the hospital after an episode in which she became amnestic it lasted at least an hour in duration he repeated himself over and over he did not have a headache associated with a context the patient who does not have headaches very often duration is noted quality is described he is absolutely back to normal male he does not remember any of the event but apparently was even talking to his daughter who is a doctor on the phone and does not remember any of that it is now told that he was talking to her.  By my independent eyeball review the MRI of the brain is normal there is a tiny AVM in the rfanklin which is absolutely stable and long-standing.      Past medical history:  Past Medical History:   Diagnosis Date   • Anticardiolipin antibody positive    • Deep vein thrombosis    • Erectile dysfunction    • Hernia, inguinal    • Hyperlipemia    • Hypertension        Past surgical history:  Past Surgical History:   Procedure Laterality Date   • CARDIAC CATHETERIZATION     • INGUINAL HERNIA REPAIR Right 2018    Procedure: RIGHT INGUINAL HERNIA REPAIR WITH MESH;  Surgeon: Jose Pierson Jr., MD;  Location: [facility];  Service:    • TONSILLECTOMY AND ADENOIDECTOMY     • UMBILICAL HERNIA REPAIR N/A 2018    Procedure: UMBILICAL HERNIA REPAIR WITH MESH;  Surgeon: Jose Pierson Jr., MD;  Location: [facility];  Service:        Allergies:  Patient has no known allergies.    Home medications:  Prescriptions Prior to Admission   Medication Sig Dispense Refill Last Dose   • amoxicillin (AMOXIL) 500 MG capsule Take 500 mg by mouth  2 (Two) Times a Day.   5/25/2018 at 1430   • clobetasol (OLUX) 0.05 % topical foam Apply 1 application topically 2 (Two) Times a Day As Needed.      • fexofenadine (ALLEGRA) 180 MG tablet Take 180 mg by mouth Daily As Needed.   Past Week at Unknown time   • Multiple Vitamins-Minerals (MULTIVITAMIN WITH MINERALS) tablet tablet Take 1 tablet by mouth Daily.   5/24/2018 at Unknown time   • simvastatin (ZOCOR) 20 MG tablet Take 20 mg by mouth Every Night.   5/24/2018 at Unknown time   • tadalafil (CIALIS) 5 MG tablet Take 5 mg by mouth Daily As Needed for erectile dysfunction.   Taking   • triamterene-hydrochlorothiazide (DYAZIDE) 37.5-25 MG per capsule Take 1 capsule by mouth Every Morning.   5/24/2018 at Unknown time   • warfarin (COUMADIN) 2 MG tablet Take 8 mg by mouth 4 (Four) Times a Week. Sun, Tues, Thurs, Sat   5/24/2018 at Unknown time   • warfarin (COUMADIN) 2 MG tablet Take 7 mg by mouth 3 (Three) Times a Week. Mon, Wed, Fri   5/23/2018        Hospital medications:    amoxicillin 500 mg Oral Q12H   atorvastatin 80 mg Oral Nightly   triamterene-hydrochlorothiazide 1 capsule Oral QAM        bisacodyl  •  calcium carbonate  •  nitroglycerin  •  ondansetron **OR** ondansetron ODT **OR** ondansetron  •  sodium chloride  •  sodium chloride    Family history:  Family History   Problem Relation Age of Onset   • Breast cancer Mother    • Colon polyps Father    • Melanoma Brother    • Prostate cancer Brother    • Malig Hyperthermia Neg Hx        Social history:  Social History   Substance Use Topics   • Smoking status: Never Smoker   • Smokeless tobacco: Never Used   • Alcohol use 3.0 oz/week     5 Standard drinks or equivalent per week      Comment: per week       Review of systems:    No headache hair eyes ears nose throat skin bone joint  lymphatic hematologic or oncologic complaints no neck pain chest pain abdominal pain bowel bladder incontinence fever chills or rash he has had DVTs for which he is on Coumadin  and was positive anti-cardiolipin antibody    Objective:  Vitals Ranges:   Temp:  [97.6 °F (36.4 °C)-99 °F (37.2 °C)] 97.8 °F (36.6 °C)  Heart Rate:  [78-84] 79  Resp:  [16-20] 18  BP: (116-155)/(66-87) 117/73      Physical Exam:  Awake alert oriented ×3 in no distress fund of knowledge attention span and concentration recent remote memory and language is all normal he does not remember what happened yesterday for this event of course in no distress pupils one half constricting to 1 normal disc retinas visual fields extraocular movements full without nystagmus nasolabial folds palate tongue symmetrical normal hearing facial sensation head turning shoulder shrug motor 5 out of 5 times for extremities no atrophy fasciculations rigidity bradykinesia resting tremor reflexes trace throughout symmetrical toes downgoing bilaterally sensation normal light touch face both arms both legs coordination normal in the upper extremities station and gait was not tested heart regular without murmur neck supple without bruits extremities no clubbing cyanosis edema visual acuity is normal at 3 feet on term memory is normal now    Results review:   I reviewed the patient's new clinical results.    Data review:  Lab Results (last 24 hours)     Procedure Component Value Units Date/Time    Blood Culture - Blood, [[id number]]  (Normal) Collected:  05/25/18 1208    Specimen:  Blood from Hand, Right Updated:  05/26/18 1230     Blood Culture No growth at 24 hours    Lipid Panel [[id number]]  (Abnormal) Collected:  05/26/18 0543    Specimen:  Blood Updated:  05/26/18 0632     Total Cholesterol 134 mg/dL      Triglycerides 86 mg/dL      HDL Cholesterol 32 (L) mg/dL      LDL Cholesterol  85 mg/dL      VLDL Cholesterol 17.2 mg/dL      LDL/HDL Ratio 2.65    Narrative:       Cholesterol Reference Ranges  (U.S. Department of Health and Human Services ATP III Classifications)    Desirable          <200 mg/dL  Borderline High    200-239 mg/dL  High Risk           >240 mg/dL      Triglyceride Reference Ranges  (U.S. Department of Health and Human Services ATP III Classifications)    Normal           <150 mg/dL  Borderline High  150-199 mg/dL  High             200-499 mg/dL  Very High        >500 mg/dL    HDL Reference Ranges  (U.S. Department of Health and Human Services ATP III Classifcations)    Low     <40 mg/dl (major risk factor for CHD)  High    >60 mg/dl ('negative' risk factor for CHD)        LDL Reference Ranges  (U.S. Department of Health and Human Services ATP III Classifcations)    Optimal          <100 mg/dL  Near Optimal     100-129 mg/dL  Borderline High  130-159 mg/dL  High             160-189 mg/dL  Very High        >189 mg/dL    Protime-INR [[id number]]  (Abnormal) Collected:  05/26/18 0543    Specimen:  Blood Updated:  05/26/18 0626     Protime 21.1 (H) Seconds      INR 1.86 (H)    Hemoglobin A1c [[id number]]  (Normal) Collected:  05/25/18 1110    Specimen:  Blood Updated:  05/26/18 0215     Hemoglobin A1C 5.10 %     Narrative:       Hemoglobin A1C Ranges:    Increased Risk for Diabetes  5.7% to 6.4%  Diabetes                     >= 6.5%  Diabetic Goal                < 7.0%    Blood Culture - Blood, [[id number]]  (Normal) Collected:  05/25/18 1216    Specimen:  Blood from Hand, Left Updated:  05/26/18 0045     Blood Culture No growth at less than 24 hours    Protime-INR [[id number]]  (Abnormal) Collected:  05/25/18 1911    Specimen:  Blood Updated:  05/25/18 2009     Protime 21.3 (H) Seconds      INR 1.88 (H)    TSH [[id number]]  (Normal) Collected:  05/25/18 1110    Specimen:  Blood Updated:  05/25/18 1731     TSH 2.510 mIU/mL     Vitamin B12 [[id number]]  (Abnormal) Collected:  05/25/18 1110    Specimen:  Blood Updated:  05/25/18 1538     Vitamin B-12 1,674 (H) pg/mL     [place] Draw [[id number]] Collected:  05/25/18 1110    Specimen:  Blood Updated:  05/25/18 1449    Narrative:       The following orders were created for panel order [place]  Draw.  Procedure                               Abnormality         Status                     ---------                               -----------         ------                     Light Blue Top[[id number]]                                                              Green Top (Gel)[[id number]]                                                             Lavender Top[[id number]]                                                                Gold Top - SST[[id number]]                                   Final result                 Please view results for these tests on the individual orders.    Lactic Acid, Plasma [[id number]]  (Normal) Collected:  05/25/18 1216    Specimen:  Blood Updated:  05/25/18 1248     Lactate 1.1 mmol/L            Imaging:  Imaging Results (last 24 hours)     Procedure Component Value Units Date/Time    MRI Brain With & Without Contrast [[id number]] Collected:  05/25/18 1750     Updated:  05/25/18 1813    Narrative:       MRI BRAIN W WO CONTRAST-, MRI ANGIOGRAM HEAD WO CONTRAST-, MRI ANGIOGRAM  NECK W WO CONTRAST-     INDICATIONS: Stroke     TECHNIQUE: Multiplanar multisequence magnetic resonance imaging of the  brain, without and with intravenous contrast material. Magnetic  resonance angiography of the head and neck. NASCET criteria.     COMPARISON: Head CT from 5/25/2018     FINDINGS:     Brain MRI:     The diffusion-weighted images show no restricted diffusion to suggest  acute infarct.     The brain parenchyma shows minimal periventricular white matter T2 FLAIR  signal hyperintensity suggesting chronic small vessel ischemic change in  a patient this age.      A triangular 7 mm focus of enhancement and susceptibility artifact at  the mid aspect of the franklin, predominantly to the right of midline,  without associated edema or restricted diffusion, suggests vascular  malformation/developmental venous anomaly, possibility of an enhancing  lesion/neoplasm seems less likely, correlate clinically,  close interval  follow-up suggested to characterize change.      The midline structures otherwise appear unremarkable.        Flow voids in the major arteries at the base of the brain appear  unremarkable.     Mucosal thickening in maxillary sinuses, ethmoid air cells.. The  paranasal sinuses, mastoid air cells, and orbits appear otherwise  unremarkable. Nasal septal deviation is present.           Magnetic resonance angiography of the head and neck:     No hemodynamically significant focal stenosis, aneurysm, or dissection  is identified in the cervical carotid or vertebral arteries, or in the  arteries at the base of the brain.             Impression:          1. No acute infarct. Suspected vascular malformation at the franklin, as  described, follow-up suggested. Minimal periventricular white matter  changes suggesting chronic small vessel ischemic change in a patient  this age.  2. No hemodynamically significant focal stenosis, aneurysm, or  dissection is identified in the cervical carotid or vertebral arteries,  or in the arteries at the base of the brain.     This report was finalized on 5/25/2018 6:10 PM by Dr. Dangelo Anderson M.D.       MRI Angiogram Head Without Contrast [[id number]] Collected:  05/25/18 1750     Updated:  05/25/18 1813    Narrative:       MRI BRAIN W WO CONTRAST-, MRI ANGIOGRAM HEAD WO CONTRAST-, MRI ANGIOGRAM  NECK W WO CONTRAST-     INDICATIONS: Stroke     TECHNIQUE: Multiplanar multisequence magnetic resonance imaging of the  brain, without and with intravenous contrast material. Magnetic  resonance angiography of the head and neck. NASCET criteria.     COMPARISON: Head CT from 5/25/2018     FINDINGS:     Brain MRI:     The diffusion-weighted images show no restricted diffusion to suggest  acute infarct.     The brain parenchyma shows minimal periventricular white matter T2 FLAIR  signal hyperintensity suggesting chronic small vessel ischemic change in  a patient this age.      A  triangular 7 mm focus of enhancement and susceptibility artifact at  the mid aspect of the franklin, predominantly to the right of midline,  without associated edema or restricted diffusion, suggests vascular  malformation/developmental venous anomaly, possibility of an enhancing  lesion/neoplasm seems less likely, correlate clinically, close interval  follow-up suggested to characterize change.      The midline structures otherwise appear unremarkable.        Flow voids in the major arteries at the base of the brain appear  unremarkable.     Mucosal thickening in maxillary sinuses, ethmoid air cells.. The  paranasal sinuses, mastoid air cells, and orbits appear otherwise  unremarkable. Nasal septal deviation is present.           Magnetic resonance angiography of the head and neck:     No hemodynamically significant focal stenosis, aneurysm, or dissection  is identified in the cervical carotid or vertebral arteries, or in the  arteries at the base of the brain.             Impression:          1. No acute infarct. Suspected vascular malformation at the franklin, as  described, follow-up suggested. Minimal periventricular white matter  changes suggesting chronic small vessel ischemic change in a patient  this age.  2. No hemodynamically significant focal stenosis, aneurysm, or  dissection is identified in the cervical carotid or vertebral arteries,  or in the arteries at the base of the brain.     This report was finalized on 5/25/2018 6:10 PM by Dr. Dangelo Anderson M.D.       MRI Angiogram Neck With & Without Contrast [[id number]] Collected:  05/25/18 1750     Updated:  05/25/18 1813    Narrative:       MRI BRAIN W WO CONTRAST-, MRI ANGIOGRAM HEAD WO CONTRAST-, MRI ANGIOGRAM  NECK W WO CONTRAST-     INDICATIONS: Stroke     TECHNIQUE: Multiplanar multisequence magnetic resonance imaging of the  brain, without and with intravenous contrast material. Magnetic  resonance angiography of the head and neck. NASCET criteria.      COMPARISON: Head CT from 5/25/2018     FINDINGS:     Brain MRI:     The diffusion-weighted images show no restricted diffusion to suggest  acute infarct.     The brain parenchyma shows minimal periventricular white matter T2 FLAIR  signal hyperintensity suggesting chronic small vessel ischemic change in  a patient this age.      A triangular 7 mm focus of enhancement and susceptibility artifact at  the mid aspect of the franklin, predominantly to the right of midline,  without associated edema or restricted diffusion, suggests vascular  malformation/developmental venous anomaly, possibility of an enhancing  lesion/neoplasm seems less likely, correlate clinically, close interval  follow-up suggested to characterize change.      The midline structures otherwise appear unremarkable.        Flow voids in the major arteries at the base of the brain appear  unremarkable.     Mucosal thickening in maxillary sinuses, ethmoid air cells.. The  paranasal sinuses, mastoid air cells, and orbits appear otherwise  unremarkable. Nasal septal deviation is present.           Magnetic resonance angiography of the head and neck:     No hemodynamically significant focal stenosis, aneurysm, or dissection  is identified in the cervical carotid or vertebral arteries, or in the  arteries at the base of the brain.             Impression:          1. No acute infarct. Suspected vascular malformation at the franklin, as  described, follow-up suggested. Minimal periventricular white matter  changes suggesting chronic small vessel ischemic change in a patient  this age.  2. No hemodynamically significant focal stenosis, aneurysm, or  dissection is identified in the cervical carotid or vertebral arteries,  or in the arteries at the base of the brain.     This report was finalized on 5/25/2018 6:10 PM by Dr. Dangelo Anderson M.D.       CT Head Without Contrast [[id number]] Collected:  05/25/18 1310     Updated:  05/25/18 1334    Narrative:       CT  HEAD WITHOUT CONTRAST     HISTORY: Memory loss, confusion.     A noncontrasted CT examination of the brain was performed.     FINDINGS: The brain ventricles are symmetrical. There is no evidence of  intracranial hemorrhage, hydrocephalus or of abnormal extra-axial fluid.  There is an area of decreased attenuation appreciated involving the franklin  anteriorly and to the left of midline. This is likely artifactual  related to beam-hardening artifact from the skull base. A subacute or  remote infarct cannot be entirely excluded. Clinical correlation is  suggested. No convincing focal area of decreased attenuation to suggest  acute infarction is identified. Vascular calcification is noted, mild.       Impression:       No evidence of hemorrhage. Mild vascular calcification is  noted. Decreased attenuation is appreciated involving the franklin to the  left of midline anteriorly, nonspecific. This may be artifactual.  Decreased attenuation related to a subacute or remote infarct cannot be  entirely excluded. Further evaluation could be performed with a MRI  examination of the brain as indicated.      The above information was called to and discussed with Sussy Ponce.     Radiation dose reduction techniques were utilized, including automated  exposure control and exposure modulation based on body size.     This report was finalized on 5/25/2018 1:31 PM by Dr. Viraj Donnelly M.D.                Assessment and Plan:     Active Problems:    Transient global amnesia    Essential hypertension    Hyperlipidemia    History of DVT (deep vein thrombosis)    Anticardiolipin antibody positive    Long term current use of anticoagulant    This patient has suffered transient global amnesia and his MRI scan and by my independent eyeball review is normal there is no cortical abnormality to explain his transient global amnesia.  He has a tiny AVM in the franklin that is long-standing benign and asymptomatic would not recommend further neurologic  workup at this time and he is okay to go      Ajith Serrano MD  05/26/18  12:36 PM

## 2018-05-26 NOTE — SIGNIFICANT NOTE
05/26/18 1321   Rehab Time/Intention   Evaluation Not Performed other (see comments)  (RN refused. Pt being discharged.)   Rehab Treatment   Discipline speech language pathologist

## 2018-05-26 NOTE — SIGNIFICANT NOTE
05/26/18 [facility]   Rehab Time/Intention   Evaluation Not Performed other (see comments)  (Pt. states he has no problems currently with self care/ADL's and feels he does not need OT. Pt. is independent with feeding, grooming, bathing, dressing. Can ambulate to BR and shower. States no UE weakness, no FM problems, no balance,vision deficits.  )   Rehab Treatment   Discipline occupational therapist   Recommendation   OT - Next Appointment (D/c)

## 2018-05-26 NOTE — NURSING NOTE
Pt heart monitor d/c'ed. IV d/c'ed and intact. Gave neurology daughter's phone number. Went over paperwork. Pt and spouse verbalized understanding. Will continue to monitor

## 2018-05-30 LAB
BACTERIA SPEC AEROBE CULT: NORMAL
BACTERIA SPEC AEROBE CULT: NORMAL

## 2018-06-28 ENCOUNTER — OFFICE VISIT (OUTPATIENT)
Dept: SURGERY | Facility: CLINIC | Age: 73
End: 2018-06-28

## 2018-06-28 ENCOUNTER — APPOINTMENT (OUTPATIENT)
Dept: PREADMISSION TESTING | Facility: HOSPITAL | Age: 73
End: 2018-06-28

## 2018-06-28 VITALS
OXYGEN SATURATION: 97 % | WEIGHT: 203 LBS | HEART RATE: 73 BPM | BODY MASS INDEX: 25.24 KG/M2 | RESPIRATION RATE: 18 BRPM | SYSTOLIC BLOOD PRESSURE: 128 MMHG | HEIGHT: 75 IN | TEMPERATURE: 97.1 F | DIASTOLIC BLOOD PRESSURE: 73 MMHG

## 2018-06-28 VITALS
SYSTOLIC BLOOD PRESSURE: 125 MMHG | HEART RATE: 85 BPM | WEIGHT: 204.5 LBS | OXYGEN SATURATION: 98 % | BODY MASS INDEX: 25.56 KG/M2 | DIASTOLIC BLOOD PRESSURE: 75 MMHG

## 2018-06-28 DIAGNOSIS — K40.90 LEFT INGUINAL HERNIA: Primary | ICD-10-CM

## 2018-06-28 LAB
ANION GAP SERPL CALCULATED.3IONS-SCNC: 12.8 MMOL/L
BUN BLD-MCNC: 18 MG/DL (ref 8–23)
BUN/CREAT SERPL: 18.9 (ref 7–25)
CALCIUM SPEC-SCNC: 9.4 MG/DL (ref 8.6–10.5)
CHLORIDE SERPL-SCNC: 104 MMOL/L (ref 98–107)
CO2 SERPL-SCNC: 25.2 MMOL/L (ref 22–29)
CREAT BLD-MCNC: 0.95 MG/DL (ref 0.76–1.27)
DEPRECATED RDW RBC AUTO: 43.5 FL (ref 37–54)
ERYTHROCYTE [DISTWIDTH] IN BLOOD BY AUTOMATED COUNT: 12.7 % (ref 11.5–14.5)
GFR SERPL CREATININE-BSD FRML MDRD: 78 ML/MIN/1.73
GLUCOSE BLD-MCNC: 92 MG/DL (ref 65–99)
HCT VFR BLD AUTO: 42.7 % (ref 40.4–52.2)
HGB BLD-MCNC: 14.8 G/DL (ref 13.7–17.6)
INR PPP: 2.65 (ref 0.9–1.1)
MCH RBC QN AUTO: 33 PG (ref 27–32.7)
MCHC RBC AUTO-ENTMCNC: 34.7 G/DL (ref 32.6–36.4)
MCV RBC AUTO: 95.3 FL (ref 79.8–96.2)
PLATELET # BLD AUTO: 206 10*3/MM3 (ref 140–500)
PMV BLD AUTO: 10.9 FL (ref 6–12)
POTASSIUM BLD-SCNC: 3.9 MMOL/L (ref 3.5–5.2)
PROTHROMBIN TIME: 27.9 SECONDS (ref 11.7–14.2)
RBC # BLD AUTO: 4.48 10*6/MM3 (ref 4.6–6)
SODIUM BLD-SCNC: 142 MMOL/L (ref 136–145)
WBC NRBC COR # BLD: 7.8 10*3/MM3 (ref 4.5–10.7)

## 2018-06-28 PROCEDURE — 99213 OFFICE O/P EST LOW 20 MIN: CPT | Performed by: SURGERY

## 2018-06-28 PROCEDURE — 36415 COLL VENOUS BLD VENIPUNCTURE: CPT

## 2018-06-28 PROCEDURE — 85027 COMPLETE CBC AUTOMATED: CPT | Performed by: SURGERY

## 2018-06-28 PROCEDURE — 80048 BASIC METABOLIC PNL TOTAL CA: CPT | Performed by: SURGERY

## 2018-06-28 PROCEDURE — 85610 PROTHROMBIN TIME: CPT | Performed by: SURGERY

## 2018-06-28 NOTE — PROGRESS NOTES
Subjective   Ajith Dimas is a 73 y.o. male who presents to the office with a left inguinal hernia to examine the right groin.    History of Present Illness     The patient had a right inguinal hernia repair on 1/29/2018.  He is recovered well and is now scheduled for a left inguinal hernia repair on 7/2/2018.  He had some mild symptoms of discomfort in the right groin and noticed some mild bulging and presented to the office for evaluation.    Review of Systems   Constitutional: Negative for activity change, appetite change, fatigue and fever.   HENT: Negative for trouble swallowing and voice change.    Respiratory: Negative for chest tightness and shortness of breath.    Cardiovascular: Negative for chest pain and palpitations.   Gastrointestinal: Negative for abdominal pain, blood in stool, constipation, diarrhea, nausea and vomiting.   Endocrine: Negative for cold intolerance and heat intolerance.   Genitourinary: Negative for dysuria and flank pain.   Neurological: Negative for dizziness and light-headedness.   Hematological: Negative for adenopathy. Does not bruise/bleed easily.   Psychiatric/Behavioral: Negative for agitation and confusion.     Past Medical History:   Diagnosis Date   • Anticardiolipin antibody positive    • Erectile dysfunction    • Hernia, inguinal    • History of DVT (deep vein thrombosis) 2005   • Hyperlipemia    • Hypertension    • TGA (transient global amnesia)      Past Surgical History:   Procedure Laterality Date   • CARDIAC CATHETERIZATION     • INGUINAL HERNIA REPAIR Right 1/29/2018    Procedure: RIGHT INGUINAL HERNIA REPAIR WITH MESH;  Surgeon: Jose Pierson Jr., MD;  Location: [facility]  Service:    • TONSILLECTOMY AND ADENOIDECTOMY     • UMBILICAL HERNIA REPAIR N/A 1/29/2018    Procedure: UMBILICAL HERNIA REPAIR WITH MESH;  Surgeon: Jose Pierson Jr., MD;  Location: [facility]  Service:      Family History   Problem Relation Age of Onset   • Breast cancer Mother     • Colon polyps Father    • Melanoma Brother    • Prostate cancer Brother    • Malig Hyperthermia Neg Hx      Social History     Social History   • Marital status:      Spouse name: N/A   • Number of children: N/A   • Years of education: N/A     Occupational History   • sales [employer]      Social History Main Topics   • Smoking status: Never Smoker   • Smokeless tobacco: Never Used   • Alcohol use 7.2 oz/week     5 Standard drinks or equivalent, 7 Glasses of wine per week   • Drug use: No   • Sexual activity: Defer     Other Topics Concern   • Not on file     Social History Narrative   • No narrative on file       Objective   Physical Exam   Constitutional: He is oriented to person, place, and time. He appears well-developed and well-nourished.  Non-toxic appearance.   Eyes: EOM are normal. No scleral icterus.   Pulmonary/Chest: Effort normal. No respiratory distress.   Abdominal: Soft. Normal appearance. There is no tenderness. A hernia is present. Hernia confirmed positive in the left inguinal area (Moderately sized reducible left inguinal hernia). Hernia confirmed negative in the right inguinal area.   Neurological: He is alert and oriented to person, place, and time.   Skin: Skin is warm and dry.   Psychiatric: He has a normal mood and affect. His behavior is normal. Judgment and thought content normal.       Assessment/Plan       The encounter diagnosis was Left inguinal hernia.    The patient has a well-healed right inguinal hernia repair with no palpable right inguinal hernia.  The abnormality is notices likely related to the postoperative nature of the right groin.  He has a moderately sized left inguinal hernia that is reducible but has been increasingly symptomatic.  He is scheduled for left inguinal hernia repair in less than one week.

## 2018-07-02 ENCOUNTER — ANESTHESIA EVENT (OUTPATIENT)
Dept: PERIOP | Facility: HOSPITAL | Age: 73
End: 2018-07-02

## 2018-07-02 ENCOUNTER — HOSPITAL ENCOUNTER (OUTPATIENT)
Facility: HOSPITAL | Age: 73
Setting detail: HOSPITAL OUTPATIENT SURGERY
Discharge: HOME | End: 2018-07-02
Attending: SURGERY | Admitting: SURGERY

## 2018-07-02 ENCOUNTER — ANESTHESIA (OUTPATIENT)
Dept: PERIOP | Facility: HOSPITAL | Age: 73
End: 2018-07-02

## 2018-07-02 VITALS
BODY MASS INDEX: 25.22 KG/M2 | SYSTOLIC BLOOD PRESSURE: 138 MMHG | TEMPERATURE: 97.6 F | OXYGEN SATURATION: 98 % | HEIGHT: 75 IN | DIASTOLIC BLOOD PRESSURE: 86 MMHG | WEIGHT: 202.82 LBS | HEART RATE: 65 BPM | RESPIRATION RATE: 16 BRPM

## 2018-07-02 DIAGNOSIS — K40.90 LEFT INGUINAL HERNIA: ICD-10-CM

## 2018-07-02 PROBLEM — G45.4 TRANSIENT GLOBAL AMNESIA: Status: RESOLVED | Noted: 2018-05-25 | Resolved: 2018-07-02

## 2018-07-02 LAB
INR PPP: 1.07 (ref 0.9–1.1)
PROTHROMBIN TIME: 13.7 SECONDS (ref 11.7–14.2)

## 2018-07-02 PROCEDURE — 85610 PROTHROMBIN TIME: CPT | Performed by: SURGERY

## 2018-07-02 PROCEDURE — 25010000002 PROPOFOL 10 MG/ML EMULSION: Performed by: NURSE ANESTHETIST, CERTIFIED REGISTERED

## 2018-07-02 PROCEDURE — 25010000002 ONDANSETRON PER 1 MG: Performed by: NURSE ANESTHETIST, CERTIFIED REGISTERED

## 2018-07-02 PROCEDURE — 25010000002 MIDAZOLAM PER 1 MG: Performed by: ANESTHESIOLOGY

## 2018-07-02 PROCEDURE — 25010000002 FENTANYL CITRATE (PF) 100 MCG/2ML SOLUTION: Performed by: NURSE ANESTHETIST, CERTIFIED REGISTERED

## 2018-07-02 PROCEDURE — 25010000003 CEFAZOLIN IN DEXTROSE 2-4 GM/100ML-% SOLUTION: Performed by: SURGERY

## 2018-07-02 PROCEDURE — C1781 MESH (IMPLANTABLE): HCPCS | Performed by: SURGERY

## 2018-07-02 PROCEDURE — 49505 PRP I/HERN INIT REDUC >5 YR: CPT | Performed by: SURGERY

## 2018-07-02 PROCEDURE — 25010000002 NEOSTIGMINE PER 0.5 MG: Performed by: NURSE ANESTHETIST, CERTIFIED REGISTERED

## 2018-07-02 DEVICE — BARD SOFT MESH
Type: IMPLANTABLE DEVICE | Status: FUNCTIONAL
Brand: BARD SOFT MESH

## 2018-07-02 RX ORDER — ROCURONIUM BROMIDE 10 MG/ML
INJECTION, SOLUTION INTRAVENOUS AS NEEDED
Status: DISCONTINUED | OUTPATIENT
Start: 2018-07-02 | End: 2018-07-02 | Stop reason: SURG

## 2018-07-02 RX ORDER — SODIUM CHLORIDE 0.9 % (FLUSH) 0.9 %
1-10 SYRINGE (ML) INJECTION AS NEEDED
Status: DISCONTINUED | OUTPATIENT
Start: 2018-07-02 | End: 2018-07-02 | Stop reason: HOSPADM

## 2018-07-02 RX ORDER — FENTANYL CITRATE 50 UG/ML
50 INJECTION, SOLUTION INTRAMUSCULAR; INTRAVENOUS
Status: DISCONTINUED | OUTPATIENT
Start: 2018-07-02 | End: 2018-07-02 | Stop reason: HOSPADM

## 2018-07-02 RX ORDER — PROPOFOL 10 MG/ML
VIAL (ML) INTRAVENOUS AS NEEDED
Status: DISCONTINUED | OUTPATIENT
Start: 2018-07-02 | End: 2018-07-02 | Stop reason: SURG

## 2018-07-02 RX ORDER — ONDANSETRON 2 MG/ML
INJECTION INTRAMUSCULAR; INTRAVENOUS AS NEEDED
Status: DISCONTINUED | OUTPATIENT
Start: 2018-07-02 | End: 2018-07-02 | Stop reason: SURG

## 2018-07-02 RX ORDER — LIDOCAINE HYDROCHLORIDE 20 MG/ML
INJECTION, SOLUTION INFILTRATION; PERINEURAL AS NEEDED
Status: DISCONTINUED | OUTPATIENT
Start: 2018-07-02 | End: 2018-07-02 | Stop reason: SURG

## 2018-07-02 RX ORDER — CEFAZOLIN SODIUM 2 G/100ML
2 INJECTION, SOLUTION INTRAVENOUS ONCE
Status: COMPLETED | OUTPATIENT
Start: 2018-07-02 | End: 2018-07-02

## 2018-07-02 RX ORDER — LIDOCAINE HYDROCHLORIDE 10 MG/ML
0.5 INJECTION, SOLUTION EPIDURAL; INFILTRATION; INTRACAUDAL; PERINEURAL ONCE AS NEEDED
Status: DISCONTINUED | OUTPATIENT
Start: 2018-07-02 | End: 2018-07-02 | Stop reason: HOSPADM

## 2018-07-02 RX ORDER — GLYCOPYRROLATE 0.2 MG/ML
INJECTION INTRAMUSCULAR; INTRAVENOUS AS NEEDED
Status: DISCONTINUED | OUTPATIENT
Start: 2018-07-02 | End: 2018-07-02 | Stop reason: SURG

## 2018-07-02 RX ORDER — BUPIVACAINE HYDROCHLORIDE AND EPINEPHRINE 5; 5 MG/ML; UG/ML
INJECTION, SOLUTION PERINEURAL AS NEEDED
Status: DISCONTINUED | OUTPATIENT
Start: 2018-07-02 | End: 2018-07-02 | Stop reason: HOSPADM

## 2018-07-02 RX ORDER — PROMETHAZINE HYDROCHLORIDE 25 MG/ML
12.5 INJECTION, SOLUTION INTRAMUSCULAR; INTRAVENOUS ONCE AS NEEDED
Status: DISCONTINUED | OUTPATIENT
Start: 2018-07-02 | End: 2018-07-02 | Stop reason: HOSPADM

## 2018-07-02 RX ORDER — FLUMAZENIL 0.1 MG/ML
0.2 INJECTION INTRAVENOUS AS NEEDED
Status: DISCONTINUED | OUTPATIENT
Start: 2018-07-02 | End: 2018-07-02 | Stop reason: HOSPADM

## 2018-07-02 RX ORDER — OXYCODONE AND ACETAMINOPHEN 7.5; 325 MG/1; MG/1
1 TABLET ORAL ONCE AS NEEDED
Status: DISCONTINUED | OUTPATIENT
Start: 2018-07-02 | End: 2018-07-02 | Stop reason: HOSPADM

## 2018-07-02 RX ORDER — EPHEDRINE SULFATE 50 MG/ML
5 INJECTION, SOLUTION INTRAVENOUS ONCE AS NEEDED
Status: DISCONTINUED | OUTPATIENT
Start: 2018-07-02 | End: 2018-07-02 | Stop reason: HOSPADM

## 2018-07-02 RX ORDER — MIDAZOLAM HYDROCHLORIDE 1 MG/ML
1 INJECTION INTRAMUSCULAR; INTRAVENOUS
Status: DISCONTINUED | OUTPATIENT
Start: 2018-07-02 | End: 2018-07-02 | Stop reason: HOSPADM

## 2018-07-02 RX ORDER — OXYCODONE HYDROCHLORIDE AND ACETAMINOPHEN 5; 325 MG/1; MG/1
TABLET ORAL
Qty: 40 TABLET | Refills: 0 | Status: SHIPPED | OUTPATIENT
Start: 2018-07-02 | End: 2018-07-17

## 2018-07-02 RX ORDER — FAMOTIDINE 10 MG/ML
20 INJECTION, SOLUTION INTRAVENOUS ONCE
Status: COMPLETED | OUTPATIENT
Start: 2018-07-02 | End: 2018-07-02

## 2018-07-02 RX ORDER — LABETALOL HYDROCHLORIDE 5 MG/ML
5 INJECTION, SOLUTION INTRAVENOUS
Status: DISCONTINUED | OUTPATIENT
Start: 2018-07-02 | End: 2018-07-02 | Stop reason: HOSPADM

## 2018-07-02 RX ORDER — ONDANSETRON 2 MG/ML
4 INJECTION INTRAMUSCULAR; INTRAVENOUS ONCE AS NEEDED
Status: DISCONTINUED | OUTPATIENT
Start: 2018-07-02 | End: 2018-07-02 | Stop reason: HOSPADM

## 2018-07-02 RX ORDER — HYDROCODONE BITARTRATE AND ACETAMINOPHEN 7.5; 325 MG/1; MG/1
1 TABLET ORAL ONCE AS NEEDED
Status: COMPLETED | OUTPATIENT
Start: 2018-07-02 | End: 2018-07-02

## 2018-07-02 RX ORDER — DIPHENHYDRAMINE HYDROCHLORIDE 50 MG/ML
12.5 INJECTION INTRAMUSCULAR; INTRAVENOUS
Status: DISCONTINUED | OUTPATIENT
Start: 2018-07-02 | End: 2018-07-02 | Stop reason: HOSPADM

## 2018-07-02 RX ORDER — PROMETHAZINE HYDROCHLORIDE 25 MG/1
12.5 TABLET ORAL ONCE AS NEEDED
Status: DISCONTINUED | OUTPATIENT
Start: 2018-07-02 | End: 2018-07-02 | Stop reason: HOSPADM

## 2018-07-02 RX ORDER — SODIUM CHLORIDE, SODIUM LACTATE, POTASSIUM CHLORIDE, CALCIUM CHLORIDE 600; 310; 30; 20 MG/100ML; MG/100ML; MG/100ML; MG/100ML
9 INJECTION, SOLUTION INTRAVENOUS CONTINUOUS
Status: DISCONTINUED | OUTPATIENT
Start: 2018-07-02 | End: 2018-07-02 | Stop reason: HOSPADM

## 2018-07-02 RX ORDER — MIDAZOLAM HYDROCHLORIDE 1 MG/ML
2 INJECTION INTRAMUSCULAR; INTRAVENOUS
Status: DISCONTINUED | OUTPATIENT
Start: 2018-07-02 | End: 2018-07-02 | Stop reason: HOSPADM

## 2018-07-02 RX ORDER — FENTANYL CITRATE 50 UG/ML
INJECTION, SOLUTION INTRAMUSCULAR; INTRAVENOUS AS NEEDED
Status: DISCONTINUED | OUTPATIENT
Start: 2018-07-02 | End: 2018-07-02 | Stop reason: SURG

## 2018-07-02 RX ORDER — NALOXONE HCL 0.4 MG/ML
0.2 VIAL (ML) INJECTION AS NEEDED
Status: DISCONTINUED | OUTPATIENT
Start: 2018-07-02 | End: 2018-07-02 | Stop reason: HOSPADM

## 2018-07-02 RX ORDER — PROMETHAZINE HYDROCHLORIDE 25 MG/1
25 TABLET ORAL ONCE AS NEEDED
Status: DISCONTINUED | OUTPATIENT
Start: 2018-07-02 | End: 2018-07-02 | Stop reason: HOSPADM

## 2018-07-02 RX ORDER — PROMETHAZINE HYDROCHLORIDE 25 MG/1
25 SUPPOSITORY RECTAL ONCE AS NEEDED
Status: DISCONTINUED | OUTPATIENT
Start: 2018-07-02 | End: 2018-07-02 | Stop reason: HOSPADM

## 2018-07-02 RX ADMIN — NEOSTIGMINE METHYLSULFATE 2.5 MG: 1 INJECTION INTRAMUSCULAR; INTRAVENOUS; SUBCUTANEOUS at 10:32

## 2018-07-02 RX ADMIN — LIDOCAINE HYDROCHLORIDE 50 MG: 20 INJECTION, SOLUTION INFILTRATION; PERINEURAL at 09:30

## 2018-07-02 RX ADMIN — ONDANSETRON 4 MG: 2 INJECTION INTRAMUSCULAR; INTRAVENOUS at 10:23

## 2018-07-02 RX ADMIN — CEFAZOLIN SODIUM 2 G: 2 INJECTION, SOLUTION INTRAVENOUS at 09:35

## 2018-07-02 RX ADMIN — SODIUM CHLORIDE, POTASSIUM CHLORIDE, SODIUM LACTATE AND CALCIUM CHLORIDE 9 ML/HR: 600; 310; 30; 20 INJECTION, SOLUTION INTRAVENOUS at 08:06

## 2018-07-02 RX ADMIN — MIDAZOLAM 1 MG: 1 INJECTION INTRAMUSCULAR; INTRAVENOUS at 08:07

## 2018-07-02 RX ADMIN — HYDROCODONE BITARTRATE AND ACETAMINOPHEN 1 TABLET: 7.5; 325 TABLET ORAL at 11:17

## 2018-07-02 RX ADMIN — FENTANYL CITRATE 100 MCG: 50 INJECTION INTRAMUSCULAR; INTRAVENOUS at 09:30

## 2018-07-02 RX ADMIN — ROCURONIUM BROMIDE 40 MG: 10 INJECTION INTRAVENOUS at 09:30

## 2018-07-02 RX ADMIN — FAMOTIDINE 20 MG: 10 INJECTION, SOLUTION INTRAVENOUS at 08:06

## 2018-07-02 RX ADMIN — GLYCOPYRROLATE 0.3 MG: 0.2 INJECTION INTRAMUSCULAR; INTRAVENOUS at 10:07

## 2018-07-02 RX ADMIN — PROPOFOL 150 MG: 10 INJECTION, EMULSION INTRAVENOUS at 09:30

## 2018-07-02 RX ADMIN — FENTANYL CITRATE 50 MCG: 50 INJECTION INTRAMUSCULAR; INTRAVENOUS at 10:36

## 2018-07-02 RX ADMIN — GLYCOPYRROLATE 0.4 MG: 0.2 INJECTION INTRAMUSCULAR; INTRAVENOUS at 10:32

## 2018-07-02 RX ADMIN — SODIUM CHLORIDE, POTASSIUM CHLORIDE, SODIUM LACTATE AND CALCIUM CHLORIDE: 600; 310; 30; 20 INJECTION, SOLUTION INTRAVENOUS at 10:20

## 2018-07-02 NOTE — OP NOTE
Surgeon: Jose Pierson Jr., M.D.    Assistant: Jose Pierson M.D.    Pre-Operative Diagnosis:     Left inguinal hernia [K40.90]    Post-Operative Diagnosis:    Post-Op Diagnosis Codes:     * Left inguinal hernia [K40.90]    Procedure Performed:     Left inguinal hernia repair    Indications:     The patient is a pleasant 73-year-old male who has a bulge left groin that is gotten larger and increasingly symptomatic.  He presents for left inguinal hernia.  The patient understands the indications, alternatives, risks, and benefits of the procedure and wishes to proceed.     Procedure:     The patient was identified and taken to the operating room where he was placed in the supine position on the operating table.  Monitors were placed and he underwent a general endotracheal anesthesia and was appropriately monitored throughout the case by the anesthesia personnel.  The left groin was prepped and draped in the standard surgical fashion.  A standard groin incision was made with a scalpel and carried through the skin into the subcutaneous tissue.  The subcutaneous tissue was divided using Bovie electrocautery to the external oblique aponeurosis which was then divided in direction of its fibers.  The spermatic cord was then identified, surrounded, and elevated out of the groin with a Penrose drain.  The spermatic cord was skeletonized by dividing cremasteric fibers using Bovie electrocautery.  The patient was noted to have an indirect hernia.  The hernia was freed from attachments to the spermatic cord and completely reduced.  A piece of mesh was selected, soaked in antibiotics, and fashioned appropriately using a 3 x 6 Bard soft mesh.  It was then sutured in place using a Denny type repair.  It was sutured to the pubic tubercle and along the inguinal ligament using 2-0 proline sutures.  It was then tacked along conjoined tendon using 0 Ethibond sutures in an interrupted fashion.  Legs were created to reestablish  the internal ring and secured with the 0 Ethibond sutures.  The cord was noted to be hemostatic.  The area was infiltrated with 0.5% Marcaine with epinephrine as well as Exparel.  The spermatic cord was returned to its original position.  The external oblique aponeurosis was reapproximated using 2-0 Vicryl sutures.  Preethi's fascia was reapproximated using 3-0 Vicryl sutures in a running fashion.  The skin was then closed with 4-0 Monocryl in a subcuticular fashion followed by Mastisol and Steri-Strips and a sterile dressing.  The sponge, needle, and instrument counts were correct at the end the case.  The patient tolerated procedure well and was transferred to the recovery room in stable condition.    Estimated Blood Loss:      minimal    Specimens:       Order Name Source Comment Collection Info Order Time   PROTIME-INR   Collected By: Aileen Casas RN 7/2/2018  7:29 AM       Jose Pierson Jr., M.D.

## 2018-07-02 NOTE — DISCHARGE INSTRUCTIONS
You last had a pain pill at 11:17 a.m.           Dr. Jose Pierson and Dr. Isai Mercado  [address] [facility] [address]  [place] [id number] [phone]    Discharge Instructions for Hernia Surgery      1. Go home, rest and take it easy today; however, you should get up and move about several times today to reduce the risk of developing a clot in your legs.      2. You may experience some dizziness or memory loss from the anesthesia.  This may last for the next 24 hours.  Someone should plan on staying with you for the first 24 hours for your safety.    3. Do not make any important legal decisions or sign any legal papers for the next 24 hours.      4. Eat and drink lightly today.  Start off with liquids, jello, soup, crackers or other bland foods at first. You may advance your diet tomorrow as tolerated as long as you do not experience any nausea or vomiting.     5. You may remove your outer dressings in 2 days.  The white tapes called steri-strips should stay in place.  They will fall off on their own in 1-2 weeks.  Do not worry if they come off sooner.      6. You may notice some bleeding/drainage on your outer dressings. A little bloody drainage is normal. If the bleeding/drainage is such that the bandage cannot absorb it, remove the dressing, apply clean gauze and apply firm pressure for a full 15 minutes.  If the bleeding continues, please call me.    7. You may shower tomorrow.  No tub baths until your incisions are completely healed.     8. No lifting > 20 lbs. until you are seen at your follow-up visit.         9. You have received a prescription for a narcotic pain medicine, as you will have some pain following surgery.   You will not be totally pain free, but your pain medicine should make the pain tolerable.  Please take your pain medicine as prescribed and always take your pills with food to prevent nausea. If you are having severe pain that cannot be controlled by the pain medicine, please contact me.       10. If you had a laparoscopic surgery, it is not unusual to experience pain/discomfort in your shoulders or under your ribs after surgery.  It is from the gas used during the laparoscopic procedure and usually lasts 1-3 days.  The prescription pain medicine is used to treat the surgical pain and does not typically alleviate this “gassy” pain.     11. No driving for 24 hours and for as long as you are taking your prescription pain medicine.      12. You will need to call the office at [phone] to schedule a follow-up appointment in 2 weeks.           13. Remember to contact me for any of the following:    • Fever > 101 degrees  • Severe pain that cannot be controlled by taking your pain pills  • Severe nausea or vomiting   • Significant bleeding of your incisions  • Drainage that has a bad smell or is yellow or green in appearance  • Any other questions or concerns        Additional Instruction for Inguinal Hernia Patients Only    1. If you did not urinate at the hospital after your surgery or if you feel the need to urinate and cannot, this will necessitate a return to the Emergency Room for placement of a urinary catheter.  You should also notify me as well.  As a rule, you should be able to empty your bladder within 4-6 hours after discharge from the hospital.      You may notice some scrotal bruising and/or swelling. A scrotal support or briefs as well as ice packs may be used to alleviate discomfort

## 2018-07-02 NOTE — H&P (VIEW-ONLY)
Subjective   Ajith Dimas is a 73 y.o. male who presents to the office with a left inguinal hernia to examine the right groin.    History of Present Illness     The patient had a right inguinal hernia repair on 1/29/2018.  He is recovered well and is now scheduled for a left inguinal hernia repair on 7/2/2018.  He had some mild symptoms of discomfort in the right groin and noticed some mild bulging and presented to the office for evaluation.    Review of Systems   Constitutional: Negative for activity change, appetite change, fatigue and fever.   HENT: Negative for trouble swallowing and voice change.    Respiratory: Negative for chest tightness and shortness of breath.    Cardiovascular: Negative for chest pain and palpitations.   Gastrointestinal: Negative for abdominal pain, blood in stool, constipation, diarrhea, nausea and vomiting.   Endocrine: Negative for cold intolerance and heat intolerance.   Genitourinary: Negative for dysuria and flank pain.   Neurological: Negative for dizziness and light-headedness.   Hematological: Negative for adenopathy. Does not bruise/bleed easily.   Psychiatric/Behavioral: Negative for agitation and confusion.     Past Medical History:   Diagnosis Date   • Anticardiolipin antibody positive    • Erectile dysfunction    • Hernia, inguinal    • History of DVT (deep vein thrombosis) 2005   • Hyperlipemia    • Hypertension    • TGA (transient global amnesia)      Past Surgical History:   Procedure Laterality Date   • CARDIAC CATHETERIZATION     • INGUINAL HERNIA REPAIR Right 1/29/2018    Procedure: RIGHT INGUINAL HERNIA REPAIR WITH MESH;  Surgeon: Jose Pierson Jr., MD;  Location: [facility];  Service:    • TONSILLECTOMY AND ADENOIDECTOMY     • UMBILICAL HERNIA REPAIR N/A 1/29/2018    Procedure: UMBILICAL HERNIA REPAIR WITH MESH;  Surgeon: Jose Pierson Jr., MD;  Location: [facility];  Service:      Family History   Problem Relation Age of Onset   • Breast cancer Mother     • Colon polyps Father    • Melanoma Brother    • Prostate cancer Brother    • Malig Hyperthermia Neg Hx      Social History     Social History   • Marital status:      Spouse name: N/A   • Number of children: N/A   • Years of education: N/A     Occupational History   • sales [employer]      Social History Main Topics   • Smoking status: Never Smoker   • Smokeless tobacco: Never Used   • Alcohol use 7.2 oz/week     5 Standard drinks or equivalent, 7 Glasses of wine per week   • Drug use: No   • Sexual activity: Defer     Other Topics Concern   • Not on file     Social History Narrative   • No narrative on file       Objective   Physical Exam   Constitutional: He is oriented to person, place, and time. He appears well-developed and well-nourished.  Non-toxic appearance.   Eyes: EOM are normal. No scleral icterus.   Pulmonary/Chest: Effort normal. No respiratory distress.   Abdominal: Soft. Normal appearance. There is no tenderness. A hernia is present. Hernia confirmed positive in the left inguinal area (Moderately sized reducible left inguinal hernia). Hernia confirmed negative in the right inguinal area.   Neurological: He is alert and oriented to person, place, and time.   Skin: Skin is warm and dry.   Psychiatric: He has a normal mood and affect. His behavior is normal. Judgment and thought content normal.       Assessment/Plan       The encounter diagnosis was Left inguinal hernia.    The patient has a well-healed right inguinal hernia repair with no palpable right inguinal hernia.  The abnormality is notices likely related to the postoperative nature of the right groin.  He has a moderately sized left inguinal hernia that is reducible but has been increasingly symptomatic.  He is scheduled for left inguinal hernia repair in less than one week.

## 2018-07-02 NOTE — ANESTHESIA PREPROCEDURE EVALUATION
Anesthesia Evaluation     Patient summary reviewed and Nursing notes reviewed   NPO Solid Status: > 8 hours  NPO Liquid Status: > 2 hours           Airway   Mallampati: II  no difficulty expected  Comment: Short chin  Dental - normal exam     Pulmonary     breath sounds clear to auscultation  Cardiovascular     ECG reviewed  Rhythm: regular  Rate: normal    (+) hypertension, hyperlipidemia,     ROS comment: Left ventricular systolic function is normal. Calculated EF = 63.8%. Estimated EF was in agreement with the calculated EF. Estimated EF = 64%. Normal left ventricular cavity size and wall thickness noted. All left ventricular wall segments contract normally. Septal wall motion is normal. Left ventricular diastolic function is normal. Normal left atrial pressure. There is no evidence of a left ventricular mass or thrombus present.    Right Ventricle Normal right ventricular cavity size, wall thickness, systolic function and septal motion noted.    Left Atrium Normal left atrial size and volume noted. There is no spontaneous echo contrast present. The interatrial septum does not appear to be redundant. No interatrial septal aneurysm present. No lipomatous hypertrophy of the interatrial septum present. No evidence of a patent foramen ovale. No evidence of an atrial septal defect present. . Saline test results are negative.    Right Atrium Normal right atrial size noted.    Aortic Valve The aortic valve is structurally normal. No aortic valve regurgitation is present. No aortic valve stenosis is present.    Mitral Valve The mitral valve is normal in structure. No mitral valve regurgitation is present. No significant mitral valve stenosis is present.    Tricuspid Valve The tricuspid valve is normal. No tricuspid valve stenosis is present. No tricuspid valve regurgitation is present.    Pulmonic Valve The pulmonic valve is structurally normal. There is no significant pulmonic valve stenosis present. There is no  pulmonic valve regurgitation present.    Greater Vessels No dilation of the aortic root is present.    Pericardium The pericardium is normal. There is no evidence of pericardial effusion.          Neuro/Psych    ROS Comment: Transient global amnesia  GI/Hepatic/Renal/Endo      Musculoskeletal     Abdominal    Substance History      OB/GYN          Other                        Anesthesia Plan    ASA 3     general     intravenous and inhalational induction   Anesthetic plan and risks discussed with patient.

## 2018-07-02 NOTE — ANESTHESIA POSTPROCEDURE EVALUATION
"Patient: Ajith Dimas    Procedure Summary     Date:  07/02/18 Room / Location:  [facility] OR [address] [place] [address] MAIN OR    Anesthesia Start:  0923 Anesthesia Stop:  1050    Procedure:  LEFT INGUINAL HERNIA REPAIR WITH MESH PLACEMENT (Left Abdomen) Diagnosis:       Left inguinal hernia      (Left inguinal hernia [K40.90])    Surgeon:  Jose Pierson Jr., MD Provider:  Dawson Varghese MD    Anesthesia Type:  general ASA Status:  3          Anesthesia Type: general  Last vitals  BP   130/65 (07/02/18 1100)   Temp   36.4 °C (97.6 °F) (07/02/18 1046)   Pulse   63 (07/02/18 1100)   Resp   16 (07/02/18 1100)     SpO2   99 % (07/02/18 1100)     Post Anesthesia Care and Evaluation    Patient location during evaluation: bedside  Patient participation: complete - patient participated  Level of consciousness: awake  Pain score: 2  Pain management: adequate  Airway patency: patent  Anesthetic complications: No anesthetic complications    Cardiovascular status: acceptable  Respiratory status: acceptable  Hydration status: acceptable    Comments: /65   Pulse 63   Temp 36.4 °C (97.6 °F) (Oral)   Resp 16   Ht 190.5 cm (75\")   Wt 92 kg (202 lb 13.2 oz)   SpO2 99%   BMI 25.35 kg/m²         "

## 2018-07-02 NOTE — ANESTHESIA PROCEDURE NOTES
Airway  Urgency: elective    Date/Time: 7/2/2018 9:33 AM  Airway not difficult    General Information and Staff    Patient location during procedure: OR  Anesthesiologist: ROBERT RUSSO  CRNA: MILAD LOVE    Indications and Patient Condition  Indications for airway management: airway protection    Preoxygenated: yes  MILS maintained throughout  Mask difficulty assessment: 1 - vent by mask    Final Airway Details  Final airway type: endotracheal airway      Successful airway: ETT  Cuffed: yes   Successful intubation technique: direct laryngoscopy  Facilitating devices/methods: anterior pressure/BURP  Blade: Ceja  Blade size: #2  ETT size: 8.0 mm  Cormack-Lehane Classification: grade I - full view of glottis  Placement verified by: chest auscultation and capnometry   Measured from: lips  ETT to lips (cm): 22  Number of attempts at approach: 1

## 2018-07-17 ENCOUNTER — OFFICE VISIT (OUTPATIENT)
Dept: SURGERY | Facility: CLINIC | Age: 73
End: 2018-07-17

## 2018-07-17 VITALS
BODY MASS INDEX: 25.22 KG/M2 | OXYGEN SATURATION: 98 % | DIASTOLIC BLOOD PRESSURE: 70 MMHG | WEIGHT: 202.8 LBS | SYSTOLIC BLOOD PRESSURE: 130 MMHG | HEART RATE: 77 BPM | HEIGHT: 75 IN

## 2018-07-17 DIAGNOSIS — Z48.89 POSTOPERATIVE VISIT: Primary | ICD-10-CM

## 2018-07-17 PROCEDURE — 99024 POSTOP FOLLOW-UP VISIT: CPT | Performed by: SURGERY

## 2018-07-24 NOTE — PROGRESS NOTES
Subjective   Ajith Dimas is a 73 y.o. male who returns to the office after undergoing a left inguinal hernia repair on 7/2/2018.     History of Present Illness     The patient is recovering well with no significant postop symptoms.  He is having no abdominal pain.  He has a good appetite and normal bowel function.  His energy level is good.      Review of Systems   Constitutional: Negative for activity change, appetite change, fatigue and fever.   Respiratory: Negative for chest tightness and shortness of breath.    Cardiovascular: Negative for chest pain and palpitations.   Gastrointestinal: Negative for abdominal pain, constipation, diarrhea and nausea.   Skin: Negative for rash and wound.   Psychiatric/Behavioral: Negative for agitation and confusion.       Objective   Physical Exam   Constitutional: He is oriented to person, place, and time. He appears well-developed and well-nourished.  Non-toxic appearance.   Eyes: EOM are normal. No scleral icterus.   Pulmonary/Chest: Effort normal. No respiratory distress.   Abdominal: Soft. Normal appearance. There is no tenderness.   Neurological: He is alert and oriented to person, place, and time.   Skin: Skin is warm and dry.   Psychiatric: He has a normal mood and affect. His behavior is normal. Judgment and thought content normal.       Assessment/Plan   The encounter diagnosis was Postoperative visit.    The patient is recovering well from his left inguinal hernia repair.  He was advised to avoid heavy lifting for another 1 month.  He will follow-up on an as-needed basis.

## 2018-09-12 ENCOUNTER — HOSPITAL ENCOUNTER (OUTPATIENT)
Dept: MRI IMAGING | Facility: HOSPITAL | Age: 73
Discharge: HOME | End: 2018-09-12
Attending: INTERNAL MEDICINE | Admitting: INTERNAL MEDICINE

## 2018-09-12 DIAGNOSIS — Q28.3 BRAIN VASCULAR MALFORMATION: ICD-10-CM

## 2018-09-12 PROCEDURE — A9577 INJ MULTIHANCE: HCPCS | Performed by: INTERNAL MEDICINE

## 2018-09-12 PROCEDURE — 0 GADOBENATE DIMEGLUMINE 529 MG/ML SOLUTION: Performed by: INTERNAL MEDICINE

## 2018-09-12 PROCEDURE — 82565 ASSAY OF CREATININE: CPT

## 2018-09-12 PROCEDURE — 70553 MRI BRAIN STEM W/O & W/DYE: CPT

## 2018-09-12 RX ADMIN — GADOBENATE DIMEGLUMINE 16 ML: 529 INJECTION, SOLUTION INTRAVENOUS at 10:55

## 2018-09-13 LAB — CREAT BLDA-MCNC: 1 MG/DL (ref 0.6–1.3)

## 2018-09-25 ENCOUNTER — OFFICE VISIT (OUTPATIENT)
Dept: SURGERY | Facility: CLINIC | Age: 73
End: 2018-09-25

## 2018-09-25 VITALS — HEART RATE: 84 BPM | WEIGHT: 205 LBS | OXYGEN SATURATION: 98 % | BODY MASS INDEX: 25.62 KG/M2

## 2018-09-25 DIAGNOSIS — Z48.89 POSTOPERATIVE VISIT: Primary | ICD-10-CM

## 2018-09-25 PROCEDURE — 99024 POSTOP FOLLOW-UP VISIT: CPT | Performed by: SURGERY

## 2018-09-25 NOTE — PROGRESS NOTES
Subjective   Ajith Dimas is a 73 y.o. male who returns to the office after undergoing a right inguinal hernia repair on 1/29/2018 followed by a left inguinal hernia repair on 7/2/2018.     History of Present Illness     The patient has recovered well and is getting ready to resume normal activity but he is concerned about risk for recurrent hernia.  He is having minimal pain in each groin rarely.  Overall he is having no symptoms.  He would like to be examined and make sure he can resume normal activity.    Review of Systems   Constitutional: Negative for activity change, appetite change, fatigue and fever.   HENT: Negative for trouble swallowing and voice change.    Respiratory: Negative for chest tightness and shortness of breath.    Cardiovascular: Negative for chest pain and palpitations.   Gastrointestinal: Negative for abdominal pain, blood in stool, constipation, diarrhea, nausea and vomiting.   Endocrine: Negative for cold intolerance and heat intolerance.   Genitourinary: Negative for dysuria and flank pain.   Neurological: Negative for dizziness and light-headedness.   Hematological: Negative for adenopathy. Does not bruise/bleed easily.   Psychiatric/Behavioral: Negative for agitation and confusion.       Objective   Physical Exam   Constitutional: He is oriented to person, place, and time. He appears well-developed and well-nourished.  Non-toxic appearance.   Eyes: EOM are normal. No scleral icterus.   Pulmonary/Chest: Effort normal. No respiratory distress.   Abdominal: Soft. Normal appearance. There is no tenderness. Hernia confirmed negative in the right inguinal area and confirmed negative in the left inguinal area.   Neurological: He is alert and oriented to person, place, and time.   Skin: Skin is warm and dry.   Psychiatric: He has a normal mood and affect. His behavior is normal. Judgment and thought content normal.       Assessment/Plan   The encounter diagnosis was Postoperative  visit.    The patient has recovered well from a right inguinal hernia repair followed by a left inguinal hernia repair.  There is no evidence of any complication and both groins feel intact and secure.  He was advised that he should have no limitations with activity and resuming exercise is no problem.  He will follow-up on an as-needed basis.

## 2019-01-15 ENCOUNTER — OFFICE VISIT (OUTPATIENT)
Dept: SURGERY | Facility: CLINIC | Age: 74
End: 2019-01-15

## 2019-01-15 VITALS — WEIGHT: 206.2 LBS | BODY MASS INDEX: 25.77 KG/M2 | HEART RATE: 77 BPM | OXYGEN SATURATION: 95 %

## 2019-01-15 DIAGNOSIS — R10.31 BILATERAL GROIN PAIN: Primary | ICD-10-CM

## 2019-01-15 DIAGNOSIS — R10.32 BILATERAL GROIN PAIN: Primary | ICD-10-CM

## 2019-01-15 PROCEDURE — 99213 OFFICE O/P EST LOW 20 MIN: CPT | Performed by: SURGERY

## 2019-01-15 NOTE — PROGRESS NOTES
Subjective   Ajith Dimas is a 73 y.o. male who returns to the office to evaluate for bilateral groin pain.    History of Present Illness     The patient had a right inguinal hernia repair on 1/29/2018 followed by a left inguinal hernia repair on 7/2/2018.  He is recovered well from those procedures but recently developed mild pain in both groins that has since resolved.    Review of Systems   Constitutional: Negative for activity change, appetite change, fatigue and fever.   HENT: Negative for trouble swallowing and voice change.    Respiratory: Negative for chest tightness and shortness of breath.    Cardiovascular: Negative for chest pain and palpitations.   Gastrointestinal: Positive for abdominal pain. Negative for blood in stool, constipation, diarrhea, nausea and vomiting.   Endocrine: Negative for cold intolerance and heat intolerance.   Genitourinary: Negative for dysuria and flank pain.   Neurological: Negative for dizziness and light-headedness.   Hematological: Negative for adenopathy. Does not bruise/bleed easily.   Psychiatric/Behavioral: Negative for agitation and confusion.     Past Medical History:   Diagnosis Date   • Anticardiolipin antibody positive    • Erectile dysfunction    • Hernia, inguinal    • History of DVT (deep vein thrombosis) 2005   • Hyperlipemia    • Hypertension    • TGA (transient global amnesia)      Past Surgical History:   Procedure Laterality Date   • CARDIAC CATHETERIZATION     • INGUINAL HERNIA REPAIR Right 1/29/2018    Procedure: RIGHT INGUINAL HERNIA REPAIR WITH MESH;  Surgeon: Jose Pierson Jr., MD;  Location: [facility];  Service:    • INGUINAL HERNIA REPAIR Left 7/2/2018    Procedure: LEFT INGUINAL HERNIA REPAIR WITH MESH PLACEMENT;  Surgeon: Jose Pierson Jr., MD;  Location: [facility];  Service: General   • TONSILLECTOMY AND ADENOIDECTOMY     • UMBILICAL HERNIA REPAIR N/A 1/29/2018    Procedure: UMBILICAL HERNIA REPAIR WITH MESH;  Surgeon: Jose Pierson  MD Blaze;  Location: [facility] OR;  Service:      Family History   Problem Relation Age of Onset   • Breast cancer Mother    • Colon polyps Father    • Melanoma Brother    • Prostate cancer Brother    • Malig Hyperthermia Neg Hx      Social History     Socioeconomic History   • Marital status:      Spouse name: Not on file   • Number of children: Not on file   • Years of education: Not on file   • Highest education level: Not on file   Social Needs   • Financial resource strain: Not on file   • Food insecurity - worry: Not on file   • Food insecurity - inability: Not on file   • Transportation needs - medical: Not on file   • Transportation needs - non-medical: Not on file   Occupational History   • Occupation: sales exc   Tobacco Use   • Smoking status: Never Smoker   • Smokeless tobacco: Never Used   Substance and Sexual Activity   • Alcohol use: Yes     Alcohol/week: 7.2 oz     Types: 5 Standard drinks or equivalent, 7 Glasses of wine per week   • Drug use: No   • Sexual activity: Defer   Other Topics Concern   • Not on file   Social History Narrative   • Not on file       Objective   Physical Exam   Constitutional: He is oriented to person, place, and time. He appears well-developed and well-nourished.  Non-toxic appearance.   Eyes: EOM are normal. No scleral icterus.   Pulmonary/Chest: Effort normal. No respiratory distress.   Abdominal: Soft. Normal appearance. There is no tenderness. Hernia confirmed negative in the right inguinal area and confirmed negative in the left inguinal area.   Neurological: He is alert and oriented to person, place, and time.   Skin: Skin is warm and dry.   Psychiatric: He has a normal mood and affect. His behavior is normal. Judgment and thought content normal.       Assessment/Plan       The encounter diagnosis was Bilateral groin pain.    The patient had episodes of bilateral groin pain has now resolved.  There is no evidence of recurrent right inguinal hernia or left  inguinal hernia on physical exam.  He will follow-up on an as-needed basis.

## 2019-02-08 ENCOUNTER — APPOINTMENT (OUTPATIENT)
Dept: CT IMAGING | Facility: HOSPITAL | Age: 74
End: 2019-02-08

## 2019-02-08 ENCOUNTER — ANESTHESIA (OUTPATIENT)
Dept: PERIOP | Facility: HOSPITAL | Age: 74
End: 2019-02-08

## 2019-02-08 ENCOUNTER — HOSPITAL ENCOUNTER (INPATIENT)
Facility: HOSPITAL | Age: 74
LOS: 10 days | Discharge: HOME | End: 2019-02-19
Attending: EMERGENCY MEDICINE | Admitting: SURGERY

## 2019-02-08 ENCOUNTER — ANESTHESIA EVENT (OUTPATIENT)
Dept: PERIOP | Facility: HOSPITAL | Age: 74
End: 2019-02-08

## 2019-02-08 DIAGNOSIS — K35.30 ACUTE APPENDICITIS WITH LOCALIZED PERITONITIS, UNSPECIFIED WHETHER ABSCESS PRESENT, UNSPECIFIED WHETHER GANGRENE PRESENT, UNSPECIFIED WHETHER PERFORATION PRESENT: ICD-10-CM

## 2019-02-08 DIAGNOSIS — K35.30 ACUTE APPENDICITIS WITH LOCALIZED PERITONITIS, WITHOUT PERFORATION, ABSCESS, OR GANGRENE: Primary | ICD-10-CM

## 2019-02-08 PROBLEM — K37 APPENDICITIS: Status: ACTIVE | Noted: 2019-02-08

## 2019-02-08 LAB
ABO GROUP BLD: NORMAL
ALBUMIN SERPL-MCNC: 4.3 G/DL (ref 3.5–5.2)
ALBUMIN/GLOB SERPL: 1.5 G/DL
ALP SERPL-CCNC: 59 U/L (ref 39–117)
ALT SERPL W P-5'-P-CCNC: 17 U/L (ref 1–41)
ANION GAP SERPL CALCULATED.3IONS-SCNC: 20.8 MMOL/L
AST SERPL-CCNC: 19 U/L (ref 1–40)
BASOPHILS # BLD AUTO: 0 10*3/MM3 (ref 0–0.2)
BASOPHILS NFR BLD AUTO: 0 % (ref 0–1.5)
BILIRUB SERPL-MCNC: 0.9 MG/DL (ref 0.1–1.2)
BILIRUB UR QL STRIP: NEGATIVE
BLD GP AB SCN SERPL QL: NEGATIVE
BUN BLD-MCNC: 19 MG/DL (ref 8–23)
BUN/CREAT SERPL: 22.6 (ref 7–25)
CALCIUM SPEC-SCNC: 9.3 MG/DL (ref 8.6–10.5)
CHLORIDE SERPL-SCNC: 96 MMOL/L (ref 98–107)
CLARITY UR: CLEAR
CO2 SERPL-SCNC: 22.2 MMOL/L (ref 22–29)
COLOR UR: YELLOW
CREAT BLD-MCNC: 0.84 MG/DL (ref 0.76–1.27)
DEPRECATED RDW RBC AUTO: 44.2 FL (ref 37–54)
EOSINOPHIL # BLD AUTO: 0 10*3/MM3 (ref 0–0.7)
EOSINOPHIL NFR BLD AUTO: 0 % (ref 0.3–6.2)
ERYTHROCYTE [DISTWIDTH] IN BLOOD BY AUTOMATED COUNT: 12.4 % (ref 11.5–14.5)
GFR SERPL CREATININE-BSD FRML MDRD: 90 ML/MIN/1.73
GLOBULIN UR ELPH-MCNC: 2.9 GM/DL
GLUCOSE BLD-MCNC: 167 MG/DL (ref 65–99)
GLUCOSE UR STRIP-MCNC: NEGATIVE MG/DL
HCT VFR BLD AUTO: 44.6 % (ref 40.4–52.2)
HGB BLD-MCNC: 15.2 G/DL (ref 13.7–17.6)
HGB UR QL STRIP.AUTO: NEGATIVE
IMM GRANULOCYTES # BLD AUTO: 0.03 10*3/MM3 (ref 0–0.03)
IMM GRANULOCYTES NFR BLD AUTO: 0.2 % (ref 0–0.5)
INR PPP: 1.75 (ref 0.9–1.1)
KETONES UR QL STRIP: ABNORMAL
LEUKOCYTE ESTERASE UR QL STRIP.AUTO: NEGATIVE
LIPASE SERPL-CCNC: 20 U/L (ref 13–60)
LYMPHOCYTES # BLD AUTO: 0.36 10*3/MM3 (ref 0.9–4.8)
LYMPHOCYTES NFR BLD AUTO: 2.8 % (ref 19.6–45.3)
MCH RBC QN AUTO: 33.3 PG (ref 27–32.7)
MCHC RBC AUTO-ENTMCNC: 34.1 G/DL (ref 32.6–36.4)
MCV RBC AUTO: 97.6 FL (ref 79.8–96.2)
MONOCYTES # BLD AUTO: 0.78 10*3/MM3 (ref 0.2–1.2)
MONOCYTES NFR BLD AUTO: 6.1 % (ref 5–12)
NEUTROPHILS # BLD AUTO: 11.68 10*3/MM3 (ref 1.9–8.1)
NEUTROPHILS NFR BLD AUTO: 90.9 % (ref 42.7–76)
NITRITE UR QL STRIP: NEGATIVE
PH UR STRIP.AUTO: <=5 [PH] (ref 5–8)
PLATELET # BLD AUTO: 205 10*3/MM3 (ref 140–500)
PMV BLD AUTO: 10.9 FL (ref 6–12)
POTASSIUM BLD-SCNC: 3.2 MMOL/L (ref 3.5–5.2)
PROT SERPL-MCNC: 7.2 G/DL (ref 6–8.5)
PROT UR QL STRIP: NEGATIVE
PROTHROMBIN TIME: 20.1 SECONDS (ref 11.7–14.2)
RBC # BLD AUTO: 4.57 10*6/MM3 (ref 4.6–6)
RH BLD: POSITIVE
SODIUM BLD-SCNC: 139 MMOL/L (ref 136–145)
SP GR UR STRIP: 1.02 (ref 1–1.03)
T&S EXPIRATION DATE: NORMAL
UROBILINOGEN UR QL STRIP: ABNORMAL
WBC NRBC COR # BLD: 12.85 10*3/MM3 (ref 4.5–10.7)

## 2019-02-08 PROCEDURE — G0378 HOSPITAL OBSERVATION PER HR: HCPCS

## 2019-02-08 PROCEDURE — 44970 LAPAROSCOPY APPENDECTOMY: CPT | Performed by: SURGERY

## 2019-02-08 PROCEDURE — 25010000002 HYDROMORPHONE PER 4 MG: Performed by: SURGERY

## 2019-02-08 PROCEDURE — 0DTJ4ZZ RESECTION OF APPENDIX, PERCUTANEOUS ENDOSCOPIC APPROACH: ICD-10-PCS | Performed by: SURGERY

## 2019-02-08 PROCEDURE — 86900 BLOOD TYPING SEROLOGIC ABO: CPT | Performed by: SURGERY

## 2019-02-08 PROCEDURE — 86850 RBC ANTIBODY SCREEN: CPT | Performed by: SURGERY

## 2019-02-08 PROCEDURE — 25010000002 FENTANYL CITRATE (PF) 100 MCG/2ML SOLUTION: Performed by: ANESTHESIOLOGY

## 2019-02-08 PROCEDURE — 25010000002 PROPOFOL 10 MG/ML EMULSION: Performed by: NURSE ANESTHETIST, CERTIFIED REGISTERED

## 2019-02-08 PROCEDURE — 25010000002 HYDROMORPHONE PER 4 MG: Performed by: EMERGENCY MEDICINE

## 2019-02-08 PROCEDURE — 99284 EMERGENCY DEPT VISIT MOD MDM: CPT

## 2019-02-08 PROCEDURE — 25010000002 ONDANSETRON PER 1 MG: Performed by: SURGERY

## 2019-02-08 PROCEDURE — 36430 TRANSFUSION BLD/BLD COMPNT: CPT

## 2019-02-08 PROCEDURE — 81003 URINALYSIS AUTO W/O SCOPE: CPT | Performed by: EMERGENCY MEDICINE

## 2019-02-08 PROCEDURE — 74176 CT ABD & PELVIS W/O CONTRAST: CPT

## 2019-02-08 PROCEDURE — 25010000002 ONDANSETRON PER 1 MG: Performed by: EMERGENCY MEDICINE

## 2019-02-08 PROCEDURE — 25010000002 HYDROMORPHONE PER 4 MG: Performed by: NURSE ANESTHETIST, CERTIFIED REGISTERED

## 2019-02-08 PROCEDURE — 80053 COMPREHEN METABOLIC PANEL: CPT | Performed by: EMERGENCY MEDICINE

## 2019-02-08 PROCEDURE — 85610 PROTHROMBIN TIME: CPT | Performed by: EMERGENCY MEDICINE

## 2019-02-08 PROCEDURE — 86927 PLASMA FRESH FROZEN: CPT

## 2019-02-08 PROCEDURE — 25010000002 MIDAZOLAM PER 1 MG: Performed by: ANESTHESIOLOGY

## 2019-02-08 PROCEDURE — 25010000002 DEXAMETHASONE PER 1 MG: Performed by: NURSE ANESTHETIST, CERTIFIED REGISTERED

## 2019-02-08 PROCEDURE — P9017 PLASMA 1 DONOR FRZ W/IN 8 HR: HCPCS

## 2019-02-08 PROCEDURE — 25010000002 PIPERACILLIN SOD-TAZOBACTAM PER 1 G: Performed by: EMERGENCY MEDICINE

## 2019-02-08 PROCEDURE — 25010000002 PIPERACILLIN SOD-TAZOBACTAM PER 1 G: Performed by: SURGERY

## 2019-02-08 PROCEDURE — 85025 COMPLETE CBC W/AUTO DIFF WBC: CPT | Performed by: EMERGENCY MEDICINE

## 2019-02-08 PROCEDURE — S0260 H&P FOR SURGERY: HCPCS | Performed by: SURGERY

## 2019-02-08 PROCEDURE — 36415 COLL VENOUS BLD VENIPUNCTURE: CPT | Performed by: EMERGENCY MEDICINE

## 2019-02-08 PROCEDURE — 83690 ASSAY OF LIPASE: CPT | Performed by: EMERGENCY MEDICINE

## 2019-02-08 PROCEDURE — 88304 TISSUE EXAM BY PATHOLOGIST: CPT | Performed by: SURGERY

## 2019-02-08 PROCEDURE — 86901 BLOOD TYPING SEROLOGIC RH(D): CPT | Performed by: SURGERY

## 2019-02-08 PROCEDURE — 25010000002 KETOROLAC TROMETHAMINE PER 15 MG: Performed by: NURSE ANESTHETIST, CERTIFIED REGISTERED

## 2019-02-08 DEVICE — ETS45 RELOAD STANDARD 45MM
Type: IMPLANTABLE DEVICE | Site: ABDOMEN | Status: FUNCTIONAL
Brand: ENDOPATH

## 2019-02-08 DEVICE — CLIP LIGAT VASC HORIZON TI MD/LG GRN 6CT: Type: IMPLANTABLE DEVICE | Site: ABDOMEN | Status: FUNCTIONAL

## 2019-02-08 RX ORDER — LIDOCAINE HYDROCHLORIDE 20 MG/ML
INJECTION, SOLUTION INFILTRATION; PERINEURAL AS NEEDED
Status: DISCONTINUED | OUTPATIENT
Start: 2019-02-08 | End: 2019-02-08 | Stop reason: SURG

## 2019-02-08 RX ORDER — PROMETHAZINE HYDROCHLORIDE 25 MG/1
25 TABLET ORAL ONCE AS NEEDED
Status: CANCELLED | OUTPATIENT
Start: 2019-02-08

## 2019-02-08 RX ORDER — SODIUM CHLORIDE 0.9 % (FLUSH) 0.9 %
1-10 SYRINGE (ML) INJECTION AS NEEDED
Status: DISCONTINUED | OUTPATIENT
Start: 2019-02-08 | End: 2019-02-08 | Stop reason: HOSPADM

## 2019-02-08 RX ORDER — BUPIVACAINE HYDROCHLORIDE 2.5 MG/ML
INJECTION, SOLUTION EPIDURAL; INFILTRATION; INTRACAUDAL AS NEEDED
Status: DISCONTINUED | OUTPATIENT
Start: 2019-02-08 | End: 2019-02-08 | Stop reason: HOSPADM

## 2019-02-08 RX ORDER — LIDOCAINE HYDROCHLORIDE 10 MG/ML
0.5 INJECTION, SOLUTION EPIDURAL; INFILTRATION; INTRACAUDAL; PERINEURAL ONCE AS NEEDED
Status: DISCONTINUED | OUTPATIENT
Start: 2019-02-08 | End: 2019-02-08 | Stop reason: HOSPADM

## 2019-02-08 RX ORDER — HYDROMORPHONE HCL 110MG/55ML
PATIENT CONTROLLED ANALGESIA SYRINGE INTRAVENOUS AS NEEDED
Status: DISCONTINUED | OUTPATIENT
Start: 2019-02-08 | End: 2019-02-08 | Stop reason: SURG

## 2019-02-08 RX ORDER — DEXTROSE, SODIUM CHLORIDE, AND POTASSIUM CHLORIDE 5; .45; .15 G/100ML; G/100ML; G/100ML
50 INJECTION INTRAVENOUS CONTINUOUS
Status: DISCONTINUED | OUTPATIENT
Start: 2019-02-08 | End: 2019-02-18

## 2019-02-08 RX ORDER — MIDAZOLAM HYDROCHLORIDE 1 MG/ML
2 INJECTION INTRAMUSCULAR; INTRAVENOUS
Status: DISCONTINUED | OUTPATIENT
Start: 2019-02-08 | End: 2019-02-08 | Stop reason: HOSPADM

## 2019-02-08 RX ORDER — SODIUM CHLORIDE 9 MG/ML
INJECTION, SOLUTION INTRAVENOUS AS NEEDED
Status: DISCONTINUED | OUTPATIENT
Start: 2019-02-08 | End: 2019-02-08 | Stop reason: HOSPADM

## 2019-02-08 RX ORDER — LABETALOL HYDROCHLORIDE 5 MG/ML
5 INJECTION, SOLUTION INTRAVENOUS
Status: DISCONTINUED | OUTPATIENT
Start: 2019-02-08 | End: 2019-02-08 | Stop reason: HOSPADM

## 2019-02-08 RX ORDER — HYDROMORPHONE HYDROCHLORIDE 1 MG/ML
0.5 INJECTION, SOLUTION INTRAMUSCULAR; INTRAVENOUS; SUBCUTANEOUS
Status: DISPENSED | OUTPATIENT
Start: 2019-02-08 | End: 2019-02-18

## 2019-02-08 RX ORDER — ONDANSETRON 2 MG/ML
4 INJECTION INTRAMUSCULAR; INTRAVENOUS ONCE AS NEEDED
Status: CANCELLED | OUTPATIENT
Start: 2019-02-08

## 2019-02-08 RX ORDER — PROMETHAZINE HYDROCHLORIDE 25 MG/ML
12.5 INJECTION, SOLUTION INTRAMUSCULAR; INTRAVENOUS ONCE AS NEEDED
Status: DISCONTINUED | OUTPATIENT
Start: 2019-02-08 | End: 2019-02-08 | Stop reason: HOSPADM

## 2019-02-08 RX ORDER — EPHEDRINE SULFATE 50 MG/ML
5 INJECTION, SOLUTION INTRAVENOUS ONCE AS NEEDED
Status: CANCELLED | OUTPATIENT
Start: 2019-02-08

## 2019-02-08 RX ORDER — PROMETHAZINE HYDROCHLORIDE 25 MG/ML
6.25 INJECTION, SOLUTION INTRAMUSCULAR; INTRAVENOUS ONCE AS NEEDED
Status: CANCELLED | OUTPATIENT
Start: 2019-02-08

## 2019-02-08 RX ORDER — PROMETHAZINE HYDROCHLORIDE 25 MG/1
25 TABLET ORAL ONCE AS NEEDED
Status: DISCONTINUED | OUTPATIENT
Start: 2019-02-08 | End: 2019-02-08 | Stop reason: HOSPADM

## 2019-02-08 RX ORDER — HYDROCODONE BITARTRATE AND ACETAMINOPHEN 7.5; 325 MG/1; MG/1
1 TABLET ORAL ONCE AS NEEDED
Status: DISCONTINUED | OUTPATIENT
Start: 2019-02-08 | End: 2019-02-08 | Stop reason: HOSPADM

## 2019-02-08 RX ORDER — DIPHENHYDRAMINE HYDROCHLORIDE 50 MG/ML
12.5 INJECTION INTRAMUSCULAR; INTRAVENOUS
Status: DISCONTINUED | OUTPATIENT
Start: 2019-02-08 | End: 2019-02-08 | Stop reason: HOSPADM

## 2019-02-08 RX ORDER — ONDANSETRON 2 MG/ML
4 INJECTION INTRAMUSCULAR; INTRAVENOUS EVERY 6 HOURS PRN
Status: DISCONTINUED | OUTPATIENT
Start: 2019-02-08 | End: 2019-02-19 | Stop reason: HOSPADM

## 2019-02-08 RX ORDER — ACETAMINOPHEN 325 MG/1
650 TABLET ORAL ONCE AS NEEDED
Status: DISCONTINUED | OUTPATIENT
Start: 2019-02-08 | End: 2019-02-08 | Stop reason: HOSPADM

## 2019-02-08 RX ORDER — ONDANSETRON 2 MG/ML
4 INJECTION INTRAMUSCULAR; INTRAVENOUS ONCE AS NEEDED
Status: DISCONTINUED | OUTPATIENT
Start: 2019-02-08 | End: 2019-02-08 | Stop reason: HOSPADM

## 2019-02-08 RX ORDER — FLUMAZENIL 0.1 MG/ML
0.2 INJECTION INTRAVENOUS AS NEEDED
Status: DISCONTINUED | OUTPATIENT
Start: 2019-02-08 | End: 2019-02-08 | Stop reason: HOSPADM

## 2019-02-08 RX ORDER — HYDROMORPHONE HYDROCHLORIDE 1 MG/ML
0.5 INJECTION, SOLUTION INTRAMUSCULAR; INTRAVENOUS; SUBCUTANEOUS
Status: DISCONTINUED | OUTPATIENT
Start: 2019-02-08 | End: 2019-02-08 | Stop reason: HOSPADM

## 2019-02-08 RX ORDER — PROMETHAZINE HYDROCHLORIDE 25 MG/1
25 SUPPOSITORY RECTAL ONCE AS NEEDED
Status: DISCONTINUED | OUTPATIENT
Start: 2019-02-08 | End: 2019-02-08 | Stop reason: HOSPADM

## 2019-02-08 RX ORDER — PROPOFOL 10 MG/ML
VIAL (ML) INTRAVENOUS AS NEEDED
Status: DISCONTINUED | OUTPATIENT
Start: 2019-02-08 | End: 2019-02-08 | Stop reason: SURG

## 2019-02-08 RX ORDER — PROMETHAZINE HYDROCHLORIDE 25 MG/1
25 SUPPOSITORY RECTAL ONCE AS NEEDED
Status: CANCELLED | OUTPATIENT
Start: 2019-02-08

## 2019-02-08 RX ORDER — DEXAMETHASONE SODIUM PHOSPHATE 10 MG/ML
INJECTION INTRAMUSCULAR; INTRAVENOUS AS NEEDED
Status: DISCONTINUED | OUTPATIENT
Start: 2019-02-08 | End: 2019-02-08 | Stop reason: SURG

## 2019-02-08 RX ORDER — MIDAZOLAM HYDROCHLORIDE 1 MG/ML
1 INJECTION INTRAMUSCULAR; INTRAVENOUS
Status: DISCONTINUED | OUTPATIENT
Start: 2019-02-08 | End: 2019-02-08 | Stop reason: HOSPADM

## 2019-02-08 RX ORDER — HYDROMORPHONE HYDROCHLORIDE 1 MG/ML
0.5 INJECTION, SOLUTION INTRAMUSCULAR; INTRAVENOUS; SUBCUTANEOUS ONCE
Status: COMPLETED | OUTPATIENT
Start: 2019-02-08 | End: 2019-02-08

## 2019-02-08 RX ORDER — EPHEDRINE SULFATE 50 MG/ML
5 INJECTION, SOLUTION INTRAVENOUS ONCE AS NEEDED
Status: DISCONTINUED | OUTPATIENT
Start: 2019-02-08 | End: 2019-02-08 | Stop reason: HOSPADM

## 2019-02-08 RX ORDER — SODIUM CHLORIDE 0.9 % (FLUSH) 0.9 %
10 SYRINGE (ML) INJECTION AS NEEDED
Status: DISCONTINUED | OUTPATIENT
Start: 2019-02-08 | End: 2019-02-19 | Stop reason: HOSPADM

## 2019-02-08 RX ORDER — FENTANYL CITRATE 50 UG/ML
50 INJECTION, SOLUTION INTRAMUSCULAR; INTRAVENOUS
Status: DISCONTINUED | OUTPATIENT
Start: 2019-02-08 | End: 2019-02-08 | Stop reason: HOSPADM

## 2019-02-08 RX ORDER — ROCURONIUM BROMIDE 10 MG/ML
INJECTION, SOLUTION INTRAVENOUS AS NEEDED
Status: DISCONTINUED | OUTPATIENT
Start: 2019-02-08 | End: 2019-02-08 | Stop reason: SURG

## 2019-02-08 RX ORDER — DIPHENHYDRAMINE HCL 25 MG
25 CAPSULE ORAL
Status: DISCONTINUED | OUTPATIENT
Start: 2019-02-08 | End: 2019-02-08 | Stop reason: HOSPADM

## 2019-02-08 RX ORDER — HYDRALAZINE HYDROCHLORIDE 20 MG/ML
5 INJECTION INTRAMUSCULAR; INTRAVENOUS
Status: DISCONTINUED | OUTPATIENT
Start: 2019-02-08 | End: 2019-02-08 | Stop reason: HOSPADM

## 2019-02-08 RX ORDER — KETOROLAC TROMETHAMINE 30 MG/ML
INJECTION, SOLUTION INTRAMUSCULAR; INTRAVENOUS AS NEEDED
Status: DISCONTINUED | OUTPATIENT
Start: 2019-02-08 | End: 2019-02-08 | Stop reason: SURG

## 2019-02-08 RX ORDER — HYDROCODONE BITARTRATE AND ACETAMINOPHEN 7.5; 325 MG/1; MG/1
1 TABLET ORAL ONCE AS NEEDED
Status: CANCELLED | OUTPATIENT
Start: 2019-02-08

## 2019-02-08 RX ORDER — MAGNESIUM HYDROXIDE 1200 MG/15ML
LIQUID ORAL AS NEEDED
Status: DISCONTINUED | OUTPATIENT
Start: 2019-02-08 | End: 2019-02-08 | Stop reason: HOSPADM

## 2019-02-08 RX ORDER — ONDANSETRON 2 MG/ML
4 INJECTION INTRAMUSCULAR; INTRAVENOUS ONCE
Status: COMPLETED | OUTPATIENT
Start: 2019-02-08 | End: 2019-02-08

## 2019-02-08 RX ORDER — OXYCODONE AND ACETAMINOPHEN 7.5; 325 MG/1; MG/1
1 TABLET ORAL ONCE AS NEEDED
Status: DISCONTINUED | OUTPATIENT
Start: 2019-02-08 | End: 2019-02-08 | Stop reason: HOSPADM

## 2019-02-08 RX ORDER — FAMOTIDINE 10 MG/ML
20 INJECTION, SOLUTION INTRAVENOUS ONCE
Status: COMPLETED | OUTPATIENT
Start: 2019-02-08 | End: 2019-02-08

## 2019-02-08 RX ORDER — OXYCODONE AND ACETAMINOPHEN 7.5; 325 MG/1; MG/1
1 TABLET ORAL EVERY 4 HOURS PRN
Status: DISPENSED | OUTPATIENT
Start: 2019-02-08 | End: 2019-02-18

## 2019-02-08 RX ORDER — NALOXONE HCL 0.4 MG/ML
0.2 VIAL (ML) INJECTION AS NEEDED
Status: DISCONTINUED | OUTPATIENT
Start: 2019-02-08 | End: 2019-02-08 | Stop reason: HOSPADM

## 2019-02-08 RX ORDER — FLUMAZENIL 0.1 MG/ML
0.2 INJECTION INTRAVENOUS AS NEEDED
Status: CANCELLED | OUTPATIENT
Start: 2019-02-08

## 2019-02-08 RX ORDER — FENTANYL CITRATE 50 UG/ML
50 INJECTION, SOLUTION INTRAMUSCULAR; INTRAVENOUS
Status: CANCELLED | OUTPATIENT
Start: 2019-02-08

## 2019-02-08 RX ORDER — HYDROMORPHONE HYDROCHLORIDE 1 MG/ML
0.5 INJECTION, SOLUTION INTRAMUSCULAR; INTRAVENOUS; SUBCUTANEOUS
Status: CANCELLED | OUTPATIENT
Start: 2019-02-08

## 2019-02-08 RX ORDER — SODIUM CHLORIDE, SODIUM LACTATE, POTASSIUM CHLORIDE, CALCIUM CHLORIDE 600; 310; 30; 20 MG/100ML; MG/100ML; MG/100ML; MG/100ML
9 INJECTION, SOLUTION INTRAVENOUS CONTINUOUS
Status: DISCONTINUED | OUTPATIENT
Start: 2019-02-08 | End: 2019-02-08

## 2019-02-08 RX ADMIN — HYDROMORPHONE HYDROCHLORIDE 0.5 MG: 2 INJECTION INTRAMUSCULAR; INTRAVENOUS; SUBCUTANEOUS at 15:27

## 2019-02-08 RX ADMIN — TAZOBACTAM SODIUM AND PIPERACILLIN SODIUM 3.38 G: 375; 3 INJECTION, SOLUTION INTRAVENOUS at 09:12

## 2019-02-08 RX ADMIN — FENTANYL CITRATE 50 MCG: 50 INJECTION INTRAMUSCULAR; INTRAVENOUS at 14:47

## 2019-02-08 RX ADMIN — DEXAMETHASONE SODIUM PHOSPHATE 8 MG: 10 INJECTION INTRAMUSCULAR; INTRAVENOUS at 15:19

## 2019-02-08 RX ADMIN — OXYCODONE HYDROCHLORIDE AND ACETAMINOPHEN 1 TABLET: 7.5; 325 TABLET ORAL at 22:01

## 2019-02-08 RX ADMIN — ROCURONIUM BROMIDE 50 MG: 10 INJECTION INTRAVENOUS at 15:09

## 2019-02-08 RX ADMIN — SODIUM CHLORIDE, POTASSIUM CHLORIDE, SODIUM LACTATE AND CALCIUM CHLORIDE 9 ML/HR: 600; 310; 30; 20 INJECTION, SOLUTION INTRAVENOUS at 13:37

## 2019-02-08 RX ADMIN — ONDANSETRON 4 MG: 2 INJECTION INTRAMUSCULAR; INTRAVENOUS at 01:54

## 2019-02-08 RX ADMIN — HYDROMORPHONE HYDROCHLORIDE 0.5 MG: 1 INJECTION, SOLUTION INTRAMUSCULAR; INTRAVENOUS; SUBCUTANEOUS at 07:54

## 2019-02-08 RX ADMIN — HYDROMORPHONE HYDROCHLORIDE 0.5 MG: 2 INJECTION INTRAMUSCULAR; INTRAVENOUS; SUBCUTANEOUS at 15:21

## 2019-02-08 RX ADMIN — KETOROLAC TROMETHAMINE 30 MG: 30 INJECTION, SOLUTION INTRAMUSCULAR; INTRAVENOUS at 16:03

## 2019-02-08 RX ADMIN — HYDROMORPHONE HYDROCHLORIDE 0.5 MG: 1 INJECTION, SOLUTION INTRAMUSCULAR; INTRAVENOUS; SUBCUTANEOUS at 05:18

## 2019-02-08 RX ADMIN — POTASSIUM CHLORIDE, DEXTROSE MONOHYDRATE AND SODIUM CHLORIDE 100 ML/HR: 150; 5; 450 INJECTION, SOLUTION INTRAVENOUS at 05:18

## 2019-02-08 RX ADMIN — TAZOBACTAM SODIUM AND PIPERACILLIN SODIUM 3.38 G: 375; 3 INJECTION, SOLUTION INTRAVENOUS at 18:03

## 2019-02-08 RX ADMIN — SODIUM CHLORIDE, POTASSIUM CHLORIDE, SODIUM LACTATE AND CALCIUM CHLORIDE: 600; 310; 30; 20 INJECTION, SOLUTION INTRAVENOUS at 16:10

## 2019-02-08 RX ADMIN — FENTANYL CITRATE 50 MCG: 50 INJECTION INTRAMUSCULAR; INTRAVENOUS at 13:38

## 2019-02-08 RX ADMIN — FENTANYL CITRATE 100 MCG: 50 INJECTION INTRAMUSCULAR; INTRAVENOUS at 15:09

## 2019-02-08 RX ADMIN — HYDROMORPHONE HYDROCHLORIDE 0.5 MG: 1 INJECTION, SOLUTION INTRAMUSCULAR; INTRAVENOUS; SUBCUTANEOUS at 01:57

## 2019-02-08 RX ADMIN — SUGAMMADEX 200 MG: 100 INJECTION, SOLUTION INTRAVENOUS at 15:56

## 2019-02-08 RX ADMIN — SODIUM CHLORIDE 1000 ML: 9 INJECTION, SOLUTION INTRAVENOUS at 04:21

## 2019-02-08 RX ADMIN — ONDANSETRON 4 MG: 2 INJECTION INTRAMUSCULAR; INTRAVENOUS at 16:00

## 2019-02-08 RX ADMIN — FAMOTIDINE 20 MG: 10 INJECTION INTRAVENOUS at 13:37

## 2019-02-08 RX ADMIN — PROPOFOL 200 MG: 10 INJECTION, EMULSION INTRAVENOUS at 15:09

## 2019-02-08 RX ADMIN — LIDOCAINE HYDROCHLORIDE 100 MG: 20 INJECTION, SOLUTION INFILTRATION; PERINEURAL at 15:09

## 2019-02-08 RX ADMIN — TAZOBACTAM SODIUM AND PIPERACILLIN SODIUM 3.38 G: 375; 3 INJECTION, SOLUTION INTRAVENOUS at 03:37

## 2019-02-08 NOTE — ED PROVIDER NOTES
EMERGENCY DEPARTMENT ENCOUNTER    CHIEF COMPLAINT  Chief Complaint: Abdominal pain  History given by: patient   History limited by: n/a  Room Number: E661/1  PMD: Jamal Bah MD      HPI:  Pt is a 73 y.o. male who presents complaining of RLQ abd pain that began earlier today. He also complains of nausea, but denies vomiting, diarrhea, fever, or chills. Hx of hernia repair.     Duration:  1 day  Onset: gradual  Timing: constant   Location: RLQ  Radiation: none  Quality: pain  Intensity/Severity: moderate  Progression: unchanged   Associated Symptoms: nausea   Aggravating Factors: none  Alleviating Factors: none    PAST MEDICAL HISTORY  Active Ambulatory Problems     Diagnosis Date Noted   • Right inguinal hernia 12/28/2017   • Left inguinal hernia 05/24/2018   • Essential hypertension 05/25/2018   • Hyperlipidemia 05/25/2018   • History of DVT (deep vein thrombosis) 05/25/2018   • Anticardiolipin antibody positive 05/25/2018   • Long term current use of anticoagulant 05/25/2018     Resolved Ambulatory Problems     Diagnosis Date Noted   • Transient global amnesia 05/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     Past Medical History:   Diagnosis Date   • Anticardiolipin antibody positive    • Erectile dysfunction    • Hernia, inguinal    • History of DVT (deep vein thrombosis) 2005   • Hyperlipemia    • Hypertension    • TGA (transient global amnesia)        PAST SURGICAL HISTORY  Past Surgical History:   Procedure Laterality Date   • CARDIAC CATHETERIZATION     • INGUINAL HERNIA REPAIR Right 1/29/2018    Procedure: RIGHT INGUINAL HERNIA REPAIR WITH MESH;  Surgeon: Jose Pierson Jr., MD;  Location: [facility];  Service:    • INGUINAL HERNIA REPAIR Left 7/2/2018    Procedure: LEFT INGUINAL HERNIA REPAIR WITH MESH PLACEMENT;  Surgeon: Jose Pierson Jr., MD;  Location: [facility];  Service: General   • TONSILLECTOMY AND ADENOIDECTOMY     • UMBILICAL HERNIA REPAIR N/A 1/29/2018    Procedure: UMBILICAL HERNIA REPAIR WITH MESH;  Surgeon:  Jose Pierson Jr., MD;  Location: [facility] OR;  Service:        FAMILY HISTORY  Family History   Problem Relation Age of Onset   • Breast cancer Mother    • Colon polyps Father    • Melanoma Brother    • Prostate cancer Brother    • Malig Hyperthermia Neg Hx        SOCIAL HISTORY  Social History     Socioeconomic History   • Marital status:      Spouse name: Not on file   • Number of children: Not on file   • Years of education: Not on file   • Highest education level: Not on file   Social Needs   • Financial resource strain: Not on file   • Food insecurity - worry: Not on file   • Food insecurity - inability: Not on file   • Transportation needs - medical: Not on file   • Transportation needs - non-medical: Not on file   Occupational History   • Occupation: sales exc   Tobacco Use   • Smoking status: Never Smoker   • Smokeless tobacco: Never Used   Substance and Sexual Activity   • Alcohol use: Yes     Alcohol/week: 7.2 oz     Types: 5 Standard drinks or equivalent, 7 Glasses of wine per week   • Drug use: No   • Sexual activity: Defer   Other Topics Concern   • Not on file   Social History Narrative   • Not on file       ALLERGIES  Patient has no known allergies.    REVIEW OF SYSTEMS  Review of Systems   Constitutional: Negative for activity change, appetite change and fever.   HENT: Negative for congestion and sore throat.    Eyes: Negative.    Respiratory: Negative for cough and shortness of breath.    Cardiovascular: Negative for chest pain and leg swelling.   Gastrointestinal: Positive for abdominal pain and nausea. Negative for diarrhea and vomiting.   Endocrine: Negative.    Genitourinary: Negative for decreased urine volume and dysuria.   Musculoskeletal: Negative for neck pain.   Skin: Negative for rash and wound.   Allergic/Immunologic: Negative.    Neurological: Negative for weakness, numbness and headaches.   Hematological: Negative.    Psychiatric/Behavioral: Negative.    All other systems  reviewed and are negative.      PHYSICAL EXAM  ED Triage Vitals   Temp Heart Rate Resp BP SpO2   02/08/19 0055 02/08/19 0055 02/08/19 0055 02/08/19 0055 02/08/19 0055   96.8 °F (36 °C) 90 22 147/57 95 %      Temp src Heart Rate Source Patient Position BP Location FiO2 (%)   02/08/19 0055 02/08/19 0144 -- -- --   Tympanic Monitor          Physical Exam   Constitutional: He is oriented to person, place, and time. No distress.   HENT:   Head: Normocephalic and atraumatic.   Eyes: EOM are normal. Pupils are equal, round, and reactive to light.   Neck: Normal range of motion. Neck supple.   Cardiovascular: Normal rate, regular rhythm and normal heart sounds.   Pulmonary/Chest: Effort normal and breath sounds normal. No respiratory distress.   Abdominal: Soft. There is tenderness in the right lower quadrant. There is no rebound and no guarding.   Musculoskeletal: Normal range of motion. He exhibits no edema.   Neurological: He is alert and oriented to person, place, and time. He has normal sensation and normal strength.   Skin: Skin is warm and dry.   Psychiatric: Mood and affect normal.   Nursing note and vitals reviewed.      LAB RESULTS  Lab Results (last 24 hours)     Procedure Component Value Units Date/Time    CBC & Differential [[id number]] Collected:  02/08/19 0305    Specimen:  Blood Updated:  02/08/19 0324    Narrative:       The following orders were created for panel order CBC & Differential.  Procedure                               Abnormality         Status                     ---------                               -----------         ------                     CBC Auto Differential[[id number]]        Abnormal            Final result                 Please view results for these tests on the individual orders.    Comprehensive Metabolic Panel [[id number]]  (Abnormal) Collected:  02/08/19 0305    Specimen:  Blood Updated:  02/08/19 0346     Glucose 167 mg/dL      BUN 19 mg/dL      Creatinine 0.84 mg/dL       Sodium 139 mmol/L      Potassium 3.2 mmol/L      Chloride 96 mmol/L      CO2 22.2 mmol/L      Calcium 9.3 mg/dL      Total Protein 7.2 g/dL      Albumin 4.30 g/dL      ALT (SGPT) 17 U/L      AST (SGOT) 19 U/L      Alkaline Phosphatase 59 U/L      Total Bilirubin 0.9 mg/dL      eGFR Non African Amer 90 mL/min/1.73      Globulin 2.9 gm/dL      A/G Ratio 1.5 g/dL      BUN/Creatinine Ratio 22.6     Anion Gap 20.8 mmol/L     Narrative:       The MDRD GFR formula is only valid for adults with stable renal function between ages 18 and 70.    Lipase [[id number]]  (Normal) Collected:  02/08/19 0305    Specimen:  Blood Updated:  02/08/19 0346     Lipase 20 U/L     Protime-INR [[id number]]  (Abnormal) Collected:  02/08/19 0305    Specimen:  Blood Updated:  02/08/19 0333     Protime 20.1 Seconds      INR 1.75    CBC Auto Differential [[id number]]  (Abnormal) Collected:  02/08/19 0305    Specimen:  Blood Updated:  02/08/19 0324     WBC 12.85 10*3/mm3      RBC 4.57 10*6/mm3      Hemoglobin 15.2 g/dL      Hematocrit 44.6 %      MCV 97.6 fL      MCH 33.3 pg      MCHC 34.1 g/dL      RDW 12.4 %      RDW-SD 44.2 fl      MPV 10.9 fL      Platelets 205 10*3/mm3      Neutrophil % 90.9 %      Lymphocyte % 2.8 %      Monocyte % 6.1 %      Eosinophil % 0.0 %      Basophil % 0.0 %      Immature Grans % 0.2 %      Neutrophils, Absolute 11.68 10*3/mm3      Lymphocytes, Absolute 0.36 10*3/mm3      Monocytes, Absolute 0.78 10*3/mm3      Eosinophils, Absolute 0.00 10*3/mm3      Basophils, Absolute 0.00 10*3/mm3      Immature Grans, Absolute 0.03 10*3/mm3     Urinalysis With Microscopic If Indicated (No Culture) - Urine, Clean Catch [[id number]]  (Abnormal) Collected:  02/08/19 0531    Specimen:  Urine, Clean Catch Updated:  02/08/19 0555     Color, UA Yellow     Appearance, UA Clear     pH, UA <=5.0     Specific Gravity, UA 1.021     Glucose, UA Negative     Ketones, UA 80 mg/dL (3+)     Bilirubin, UA Negative     Blood, UA Negative     Protein, UA  Negative     Leuk Esterase, UA Negative     Nitrite, UA Negative     Urobilinogen, UA 0.2 E.U./dL    Narrative:       Urine microscopic not indicated.          I ordered the above labs and reviewed the results    RADIOLOGY  CT Abdomen Pelvis Without Contrast   Final Result   1.  Acute appendicitis with microperforation.   2. Tiny liver lesion as discussed.   3. Minimal diverticulosis                   This report was finalized on 2/8/2019 2:33 AM by Shaheen Zamudio M.D.               I ordered the above noted radiological studies. Interpreted by radiologist. Reviewed by me in PACS.       PROCEDURES  Procedures      PROGRESS AND CONSULTS       01:21  CMP, CBC, lipase, UA, PT/INR, and CT abd/pelvis ordered.     01:41  Dilaudid and Zofran ordered to treat pain and nausea.     02:25  Zosyn ordered for abx coverage. Call placed to general surgery for admission.     02:37  BP- 138/69 HR- 101 Temp- 97.6 °F (36.4 °C) (Oral) O2 sat- 95%  Informed pt that the CT abd/pelvis shows acute appendicitis. Informed him of the plan for admission for surgery, will start IV abx. Pt understands and agrees with the plan, all questions answered.    02:40  Discussed pt's case with Dr Blanca (general surgery), who agrees to admit the pt.    03:44  INR is 1.75- no coumadin reversal needed     MEDICAL DECISION MAKING  Results were reviewed/discussed with the patient and they were also made aware of online access. Pt also made aware that some labs, such as cultures, will not be resulted during ER visit and follow up with PMD is necessary.     MDM  Number of Diagnoses or Management Options     Amount and/or Complexity of Data Reviewed  Clinical lab tests: ordered and reviewed (INR=1.75)  Tests in the radiology section of CPT®: ordered and reviewed (CT abd/pelvis shows acute appendicitis with microperforation)  Discuss the patient with other providers: yes (Dr Blanca, general surgery)           DIAGNOSIS  Final diagnoses:   Acute appendicitis  with localized peritonitis, without perforation, abscess, or gangrene       DISPOSITION  ADMISSION    Discussed treatment plan and reason for admission with pt/family and admitting physician.  Pt/family voiced understanding of the plan for admission for further testing/treatment as needed.     Latest Documented Vital Signs:  As of 6:48 AM  BP- 124/68 HR- 90 Temp- 97.8 °F (36.6 °C) (Oral) O2 sat- 94%    --  Documentation assistance provided by jackeline Marie for Dr Brito.  Information recorded by the jackeline was done at my direction and has been verified and validated by me.        Jess Marie  02/08/19 0344       Qasim Brito MD  02/08/19 0698

## 2019-02-08 NOTE — ANESTHESIA POSTPROCEDURE EVALUATION
"Patient: Ajith Dimas    Procedure Summary     Date:  02/08/19 Room / Location:  [facility] OR  / [facility] MAIN OR    Anesthesia Start:  1506 Anesthesia Stop:  1623    Procedure:  Laparoscopic appendectomy (N/A Abdomen) Diagnosis:       Acute appendicitis with localized peritonitis, unspecified whether abscess present, unspecified whether gangrene present, unspecified whether perforation present      (Acute appendicitis with localized peritonitis, unspecified whether abscess present, unspecified whether gangrene present, unspecified whether perforation present [K35.30])    Surgeon:  Leonardo Blanca MD Provider:  Anibal Osei MD    Anesthesia Type:  general ASA Status:  2          Anesthesia Type: general  Last vitals  BP   125/60 (02/08/19 1720)   Temp   37.1 °C (98.8 °F) (02/08/19 1618)   Pulse   95 (02/08/19 1720)   Resp   16 (02/08/19 1720)     SpO2   93 % (02/08/19 1720)     Post Anesthesia Care and Evaluation    Patient location during evaluation: bedside  Patient participation: complete - patient participated  Level of consciousness: awake and alert  Pain management: adequate  Airway patency: patent  Anesthetic complications: No anesthetic complications    Cardiovascular status: acceptable  Respiratory status: acceptable  Hydration status: acceptable    Comments: /60   Pulse 95   Temp 37.1 °C (98.8 °F) (Oral)   Resp 16   Ht 190.5 cm (75\")   Wt 91.7 kg (202 lb 1.6 oz)   SpO2 93%   BMI 25.26 kg/m²           "

## 2019-02-08 NOTE — PROGRESS NOTES
Discharge Planning Assessment  [facility]     Patient Name: Ajith Dimas  MRN: [id number]  Today's Date: 2/8/2019    Admit Date: 2/8/2019    Discharge Needs Assessment     Row Name 02/08/19 0748       Living Environment    Lives With  significant other    Name(s) of Who Lives With Patient  Significant Other  ( Refugio Carver [phone])    Current Living Arrangements  home/apartment/condo    Primary Care Provided by  self    Provides Primary Care For  no one    Family Caregiver if Needed  significant other    Family Caregiver Names  Significant Other  ( Refugio Carver [phone])    Quality of Family Relationships  supportive;involved    Able to Return to Prior Arrangements  yes    Living Arrangement Comments  Pt lives in a two story house with Significant Other  ( Refugio Carver [phone])       Resource/Environmental Concerns    Resource/Environmental Concerns  none    Transportation Concerns  car, none       Transition Planning    Patient/Family Anticipates Transition to  home with family    Patient/Family Anticipated Services at Transition  none    Transportation Anticipated  family or friend will provide;car, drives self       Discharge Needs Assessment    Concerns to be Addressed  denies needs/concerns at this time    Equipment Currently Used at Home  none    Anticipated Changes Related to Illness  none    Equipment Needed After Discharge  none    Offered/Gave Vendor List  no        Discharge Plan     Row Name 02/08/19 0750       Plan    Plan  Plan home with significant other.   SAVAGE Barrett    Patient/Family in Agreement with Plan  yes    Plan Comments  FACE SHEET VERIFIED/ XAVIER SIGNED.  Spoke to pt at bedside.  Pt's PCP is Dr. Jamal Bah.  Pt lives in a two story house with Significant Other  ( Refugio Carver [phone]).  Pt is independent with ADL's.  Pt gets prescriptions at [facility]  ( [address] [facility] [address]).   Pt denies any issues affording medications.  Pt is not current with HH.  Pt  has not been in SNF.  Pt to have surgery later today.  Pt denies any discharge needs.  Plan home with significant other.   Arnold, RN        Destination      No service coordination in this encounter.      Durable Medical Equipment      No service coordination in this encounter.      Dialysis/Infusion      No service coordination in this encounter.      Home Medical Care      No service coordination in this encounter.      Community Resources      No service coordination in this encounter.          Demographic Summary     Row Name 02/08/19 0747       General Information    Admission Type  observation    Arrived From  emergency department    Required Notices Provided  Observation Status Notice    Referral Source  admission list    Reason for Consult  discharge planning    Preferred Language  English     Used During This Interaction  no        Functional Status     Row Name 02/08/19 0747       Functional Status    Usual Activity Tolerance  good    Current Activity Tolerance  good       Functional Status, IADL    Medications  independent    Meal Preparation  independent    Housekeeping  independent    Laundry  independent    Shopping  independent       Mental Status    General Appearance WDL  WDL       Mental Status Summary    Recent Changes in Mental Status/Cognitive Functioning  no changes        Psychosocial    No documentation.       Abuse/Neglect    No documentation.       Legal    No documentation.       Substance Abuse    No documentation.       Patient Forms    No documentation.           Roya Vee, RN

## 2019-02-08 NOTE — PLAN OF CARE
Problem: Patient Care Overview  Goal: Plan of Care Review  Outcome: Ongoing (interventions implemented as appropriate)   02/08/19 0644   Coping/Psychosocial   Plan of Care Reviewed With patient;significant other   Plan of Care Review   Progress no change   OTHER   Outcome Summary Pt rested well after arriving to the floor. Dilaudid given 1x for pain. NPO. Waiting for type & screen to result for plasma tansfusion. VSS, no s/s acute distress, will continue to monitor     Goal: Individualization and Mutuality  Outcome: Ongoing (interventions implemented as appropriate)      Problem: Pain, Acute (Adult)  Goal: Identify Related Risk Factors and Signs and Symptoms  Outcome: Outcome(s) achieved Date Met: 02/08/19    Goal: Acceptable Pain Control/Comfort Level  Outcome: Ongoing (interventions implemented as appropriate)

## 2019-02-08 NOTE — ED NOTES
Pt c/o RLQ abd pain since Thursday afternoon. + nausea.  RLQ abd tender to palpation.       Danita Ramsey RN  02/08/19 2230

## 2019-02-08 NOTE — ED TRIAGE NOTES
Pt to ED via [facility] c/o severe abd pain.  Pt reports he has had RLQ pain that radiates all over abd.  Pt denies v/d, CP or SOA. Pt reports history of hernia repair. Pt informed RN he still has appendix and gallbladder, with no other abd hx.

## 2019-02-08 NOTE — PLAN OF CARE
Problem: Patient Care Overview  Goal: Plan of Care Review  Outcome: Ongoing (interventions implemented as appropriate)   02/08/19 4951   Coping/Psychosocial   Plan of Care Reviewed With patient   Plan of Care Review   Progress improving   OTHER   Outcome Summary Patient alert and oriented. Lap sites x3 clean, dry, and intact. Patient denies pain at this time. Vital signs stable. Patient on 2 liters of oxygen nc. Patient on iv antibiotic. No signs or symptoms of distress. NSR on monitor. Patient had clear liquids and tolerated well. No complaints of nausea. Will continue to monitor.        Problem: Pain, Acute (Adult)  Goal: Acceptable Pain Control/Comfort Level  Outcome: Ongoing (interventions implemented as appropriate)

## 2019-02-08 NOTE — ANESTHESIA PROCEDURE NOTES
Airway  Urgency: elective    Airway not difficult    General Information and Staff    Patient location during procedure: OR  CRNA: Marco Eckert CRNA    Indications and Patient Condition  Indications for airway management: airway protection    Preoxygenated: yes  MILS maintained throughout  Mask difficulty assessment: 1 - vent by mask    Final Airway Details  Final airway type: endotracheal airway      Successful airway: ETT    Successful intubation technique: direct laryngoscopy  Blade: Enio  Blade size: 3  ETT size (mm): 7.0  Cormack-Lehane Classification: grade I - full view of glottis  Placement verified by: chest auscultation   Measured from: teeth  ETT to teeth (cm): 22  Number of attempts at approach: 1

## 2019-02-08 NOTE — ANESTHESIA PREPROCEDURE EVALUATION
Anesthesia Evaluation     Patient summary reviewed and Nursing notes reviewed   no history of anesthetic complications:  NPO Solid Status: > 8 hours  NPO Liquid Status: > 2 hours           Airway   Mallampati: II  TM distance: >3 FB  Neck ROM: full  Possible difficult intubation  Dental - normal exam     Pulmonary - negative pulmonary ROS and normal exam   Cardiovascular - normal exam  Exercise tolerance: good (4-7 METS)    (+) hypertension, DVT resolved, hyperlipidemia,     ROS comment: SINUS RHYTHM  LEFT AXIS DEVIATION  ABNRM R PROG, CONSIDER ASMI OR LEAD PLACEMENT  BORDERLINE PROLONGED QT INTERVAL, new compared with prior EKG  Electronically Signed by:  Isabel Ocampo ([facility]) 25-May-2018 17:59:21    EF64%    Neuro/Psych- negative ROS  GI/Hepatic/Renal/Endo - negative ROS     Musculoskeletal (-) negative ROS    Abdominal  - normal exam    Bowel sounds: normal.   Substance History - negative use     OB/GYN negative ob/gyn ROS         Other                        Anesthesia Plan    ASA 2     general     intravenous induction   Anesthetic plan, all risks, benefits, and alternatives have been provided, discussed and informed consent has been obtained with: patient.    Plan discussed with CRNA and attending.

## 2019-02-08 NOTE — OP NOTE
Operative Note :   Leonardo Blanca MD    Ajith TALAVERA Judie  1945    Procedure Date: 02/08/19    Pre-op Diagnosis:  Acute appendicitis with localized peritonitis, unspecified whether abscess present, unspecified whether gangrene present, unspecified whether perforation present [K35.30]    Post-Op Diagnosis:  Acute appendicitis with perforation    Procedure:   · Laparoscopic appendectomy    Surgeon: Leonardo Blanca MD    Assistant: Ace Trivedi    Anesthesia:  General (general endotracheal tube)    EBL:   minimal    Specimens:   Appendix    Indications:  · 73-year-old gentleman presenting with right lower quadrant pain, leukocytosis and CT concerning for acute appendicitis with possible perforation    Findings:   · Acute suppurative appendicitis with perforation of the midportion of the appendix and fecalith present, as well as a small amount of pus in the right lower quadrant    Recommendations:   · Continue postoperative IV antibiotics    Description of procedure:    After obtaining informed consent, patient was brought to the operating room and placed under a general anesthetic.  Palmer catheter was placed in the patient's abdomen was then clipped and then sterilely prepped and draped.  Timeout was performed.  Patient was already receiving routine antibiotics.  Incision was made several centimeters above the umbilicus due to the patient's prior umbilical hernia repair with mesh.  I dissected through the subcutaneous tissues onto the midline fascia and incised this.  #1 Vicryl suture was then placed in a U stitch fashion on each side of the fascia and the peritoneum was then bluntly penetrated.  Weathers trocar was introduced and the abdomen was insufflated with CO2.  Cursory examination of the abdomen did demonstrate some purulent fluid in the right lower quadrant.  5 mm trochars were then introduced, one in the right upper quadrant and one in the left lower quadrant.  Patient's position with his head slightly  down and the right side up.  Cecum was identified.  The terminal ileum as it entered the cecum was identified and this was peeled off the lateral abdominal wall.  I was able to visualize the tip of the appendix and I was able to grasp it and lifted up and  it away from the pelvic sidewall.  There is a moderate amount of purulent material in the space between the appendix and the pelvic sidewall which was suctioned.  He can see in the midportion of the appendix that there was a perforation.  I mobilized the very base of the cecum off of the lateral abdominal wall in order to be able to completely visualize the appendix.  The fold of Treves was  away from the appendix and I then dissected between the appendix and the mesial appendix crating a window.  Hemoclips were placed across the mesial appendix and I then divided the mesial appendix between the clips.  I was able to see that we had gotten all the way down to the base of the cecum and I then introduced a and a NHI stapler with a blue load and fired across the base of the appendix flush with the cecum.  The appendix was then placed in an Endo Catch bag.  There were a couple of small hard balls of stool which had come out of this ruptured appendix and there were also placed in the Endo Catch bag.  This was then removed.  I then thoroughly irrigated the abdomen.  There was some pus seen in the cul-de-sac and in the pelvis.  There was also some purulent fluid along the right edge of the liver.  After irrigating with about 4 L of saline I then again inspected the staple line at the base of the cecum and it appeared to be in good order.  No bleeding was seen.  I then removed the trochars under direct visualization.  The abdomen was desufflated.  The midline 12 mm trocar site was closed with #1 Vicryl suture.  Skin incisions were closed with 4-0 Monocryl.  Sterile dressings were applied and the patient was awakened and brought back to recovery in  stable condition.    Leonardo Blanca MD  General and Endoscopic Surgery  [facility]    [address], [address]  [place], [place], [address]  P: [phone]  F: [phone]

## 2019-02-08 NOTE — H&P
Cc: Abdominal pain    History of presenting illness:   This is a very nice, reasonably healthy 73-year-old gentleman who was feeling well until late part of yesterday morning when he began having lower abdominal pain.  The pain gradually became worse again quite severe in the evening, resulting in him presenting to the emergency department.  Evaluation in emergency department suggested acute appendicitis.  The patient describes pain worse on the right than the left and associated with some dry heaving.  There is no radiation of the pain.  No known fever.  He is never had any pain like this previously.    Past Medical History: Deep venous thrombosis, hyperlipidemia, hypertension, history of anticardiolipin antibody    Past Surgical History: Bilateral inguinal hernia repair, umbilical hernia repair with mesh    Medications: Zocor, hydrochlorothiazide, triamterene, warfarin    Allergies: None known    Social History: Nonsmoker, admits to 1 alcoholic prep which per day    Family History: Negative for colorectal cancer    Review of Systems:  Constitutional: Positive for decreased appetite, negative for fever or chills  Neck: no swollen glands or dysphagia or odynophagia  Respiratory: negative for SOB, cough, hemoptysis or wheezing  Cardiovascular: negative for chest pain, palpitations or peripheral edema  Gastrointestinal: Positive for nausea, dry heaving, abdominal pain, negative for rectal bleeding      Physical Exam:   temperature 98.1 heart rate 101 risks were rate 18 breaths minute blood pressure 137/73  General: alert and oriented, appropriate, no acute distress  Neck: Supple without lymphadenopathy or thyromegaly, trachea is in the midline  Respiratory: Lungs are clear bilaterally without wheezing, no use of accessory muscles is noted  Cardiovascular: Regular rate and rhythm without murmur, no peripheral edema  Gastrointestinal: Soft, tender in the lower abdomen, right greater than left with mild guarding but no  rebound tenderness, no hernia or hepatosplenomegaly    Laboratory data: White blood cell count 12.8 hemoglobin 15.2 INR 1.75.  Creatinine 0.84.    Imaging data: CT of the abdomen and pelvis demonstrates a grossly enlarged appendix with appendicoliths.  There is some periappendiceal inflammation and perhaps an area of extraluminal air.      Assessment and plan:   -Acute appendicitis, possible microperforation    Options discussed with patient.  He is better with antibiotics and pain medications, but he will require laparoscopic appendectomy, possible conversion to open surgery.  He understands that the risks include, but are not limited to, bleeding, infection, fistula, injury to other internal structures and is willing to proceed.      Leonardo Blanca MD, FACS  General, Minimally Invasive and Endoscopic Surgery  [facility]    [address], [address]  [place], [place], [address]  P: [phone]  F: [phone]

## 2019-02-08 NOTE — PROGRESS NOTES
Continued Stay Note  [facility]     Patient Name: Ajith Dimas  MRN: [id number]  Today's Date: 2/8/2019    Admit Date: 2/8/2019    Discharge Plan     Row Name 02/08/19 0750       Plan    Plan  Plan home with significant other.   SAVAGE Barrett    Patient/Family in Agreement with Plan  yes    Plan Comments  FACE SHEET VERIFIED/ XAVIER SIGNED.  Spoke to pt at bedside.  Pt's PCP is Dr. Jamal Bah.  Pt lives in a two story house with Significant Other  ( Refugio Carver [phone]).  Pt is independent with ADL's.  Pt gets prescriptions at [facility]  ( [address] [facility]).   Pt denies any issues affording medications.  Pt is not current with HH.  Pt has not been in SNF.  Pt to have surgery later today.  Pt denies any discharge needs.  Plan home with significant other.   SAVAGE Barrett        Discharge Codes    No documentation.             Roya Vee RN

## 2019-02-09 LAB
ABO + RH BLD: NORMAL
BH BB BLOOD EXPIRATION DATE: NORMAL
BH BB BLOOD TYPE BARCODE: 7300
BH BB DISPENSE STATUS: NORMAL
BH BB PRODUCT CODE: NORMAL
BH BB UNIT NUMBER: NORMAL
UNIT  ABO: NORMAL
UNIT  RH: NORMAL

## 2019-02-09 PROCEDURE — 99024 POSTOP FOLLOW-UP VISIT: CPT | Performed by: SURGERY

## 2019-02-09 PROCEDURE — 25010000002 PIPERACILLIN SOD-TAZOBACTAM PER 1 G: Performed by: SURGERY

## 2019-02-09 PROCEDURE — 25010000002 ENOXAPARIN PER 10 MG: Performed by: SURGERY

## 2019-02-09 PROCEDURE — 25010000002 HYDROMORPHONE PER 4 MG: Performed by: SURGERY

## 2019-02-09 PROCEDURE — 25010000002 ONDANSETRON PER 1 MG: Performed by: SURGERY

## 2019-02-09 RX ORDER — ACETAMINOPHEN 325 MG/1
650 TABLET ORAL EVERY 4 HOURS PRN
Status: DISCONTINUED | OUTPATIENT
Start: 2019-02-09 | End: 2019-02-19 | Stop reason: HOSPADM

## 2019-02-09 RX ADMIN — HYDROMORPHONE HYDROCHLORIDE 0.5 MG: 1 INJECTION, SOLUTION INTRAMUSCULAR; INTRAVENOUS; SUBCUTANEOUS at 23:11

## 2019-02-09 RX ADMIN — ONDANSETRON 4 MG: 2 INJECTION INTRAMUSCULAR; INTRAVENOUS at 23:11

## 2019-02-09 RX ADMIN — TAZOBACTAM SODIUM AND PIPERACILLIN SODIUM 3.38 G: 375; 3 INJECTION, SOLUTION INTRAVENOUS at 01:00

## 2019-02-09 RX ADMIN — HYDROMORPHONE HYDROCHLORIDE 0.5 MG: 1 INJECTION, SOLUTION INTRAMUSCULAR; INTRAVENOUS; SUBCUTANEOUS at 16:45

## 2019-02-09 RX ADMIN — ACETAMINOPHEN 650 MG: 325 TABLET, FILM COATED ORAL at 04:41

## 2019-02-09 RX ADMIN — POTASSIUM CHLORIDE, DEXTROSE MONOHYDRATE AND SODIUM CHLORIDE 100 ML/HR: 150; 5; 450 INJECTION, SOLUTION INTRAVENOUS at 21:44

## 2019-02-09 RX ADMIN — POTASSIUM CHLORIDE, DEXTROSE MONOHYDRATE AND SODIUM CHLORIDE 100 ML/HR: 150; 5; 450 INJECTION, SOLUTION INTRAVENOUS at 09:25

## 2019-02-09 RX ADMIN — TAZOBACTAM SODIUM AND PIPERACILLIN SODIUM 3.38 G: 375; 3 INJECTION, SOLUTION INTRAVENOUS at 09:26

## 2019-02-09 RX ADMIN — ACETAMINOPHEN 650 MG: 325 TABLET, FILM COATED ORAL at 21:44

## 2019-02-09 RX ADMIN — TAZOBACTAM SODIUM AND PIPERACILLIN SODIUM 3.38 G: 375; 3 INJECTION, SOLUTION INTRAVENOUS at 16:45

## 2019-02-09 RX ADMIN — ACETAMINOPHEN 650 MG: 325 TABLET, FILM COATED ORAL at 14:02

## 2019-02-09 RX ADMIN — ENOXAPARIN SODIUM 40 MG: 40 INJECTION SUBCUTANEOUS at 09:31

## 2019-02-09 RX ADMIN — POTASSIUM CHLORIDE, DEXTROSE MONOHYDRATE AND SODIUM CHLORIDE 100 ML/HR: 150; 5; 450 INJECTION, SOLUTION INTRAVENOUS at 04:45

## 2019-02-09 NOTE — PROGRESS NOTES
Chief Complaint:    S/P Laparoscopic appendectomy, POD 1    Subjective:    The patient is feeling well with minimal abdominal pain.  He has not had any nausea or vomiting but his appetite is decreased.  He does have abdominal distention.  He has not passed flatus nor had a bowel movement.    Objective:    Temp:  [98 °F (36.7 °C)-98.9 °F (37.2 °C)] 98 °F (36.7 °C)  Heart Rate:  [] 80  Resp:  [16-18] 18  BP: ()/(42-73) 98/59    Physical Exam   Constitutional: He is cooperative. He does not appear ill. No distress.   Pulmonary/Chest: Effort normal. No respiratory distress.   Abdominal: Soft. He exhibits distension. Bowel sounds are absent. There is generalized tenderness (Appropriate postop tenderness).   Neurological: He is alert.       Results:    There are no new labs this morning.    Impression/Plan:    The patient is POD 1 from a laparoscopic appendectomy for a perforated appendicitis with peritonitis.  He has an expected postop ileus and his diet will not be advanced.    Continue IV antibiotics.  His CBC will be rechecked tomorrow.    He was encouraged to increase his activity.    Jose Pierson Jr., M.D.

## 2019-02-09 NOTE — PLAN OF CARE
Problem: Patient Care Overview  Goal: Plan of Care Review  Outcome: Ongoing (interventions implemented as appropriate)   02/09/19 2204   Coping/Psychosocial   Plan of Care Reviewed With patient   Plan of Care Review   Progress improving   OTHER   Outcome Summary Pt alert and oriented x4, VSS, tolerating clear liquids. Order for Tylenol received this AM for minimal c/o pain. Will continue to monitor.        Problem: Pain, Acute (Adult)  Goal: Acceptable Pain Control/Comfort Level  Outcome: Ongoing (interventions implemented as appropriate)

## 2019-02-10 ENCOUNTER — APPOINTMENT (OUTPATIENT)
Dept: GENERAL RADIOLOGY | Facility: HOSPITAL | Age: 74
End: 2019-02-10

## 2019-02-10 LAB
ANION GAP SERPL CALCULATED.3IONS-SCNC: 10.4 MMOL/L
BUN BLD-MCNC: 13 MG/DL (ref 8–23)
BUN/CREAT SERPL: 13.8 (ref 7–25)
CALCIUM SPEC-SCNC: 8.4 MG/DL (ref 8.6–10.5)
CHLORIDE SERPL-SCNC: 99 MMOL/L (ref 98–107)
CO2 SERPL-SCNC: 28.6 MMOL/L (ref 22–29)
CREAT BLD-MCNC: 0.94 MG/DL (ref 0.76–1.27)
DEPRECATED RDW RBC AUTO: 46.8 FL (ref 37–54)
ERYTHROCYTE [DISTWIDTH] IN BLOOD BY AUTOMATED COUNT: 12.9 % (ref 11.5–14.5)
GFR SERPL CREATININE-BSD FRML MDRD: 79 ML/MIN/1.73
GLUCOSE BLD-MCNC: 134 MG/DL (ref 65–99)
HCT VFR BLD AUTO: 40.8 % (ref 40.4–52.2)
HGB BLD-MCNC: 13.5 G/DL (ref 13.7–17.6)
LYMPHOCYTES # BLD MANUAL: 1.81 10*3/MM3 (ref 0.9–4.8)
LYMPHOCYTES NFR BLD MANUAL: 12 % (ref 19.6–45.3)
LYMPHOCYTES NFR BLD MANUAL: 7 % (ref 5–12)
MCH RBC QN AUTO: 32.8 PG (ref 27–32.7)
MCHC RBC AUTO-ENTMCNC: 33.1 G/DL (ref 32.6–36.4)
MCV RBC AUTO: 99 FL (ref 79.8–96.2)
MONOCYTES # BLD AUTO: 1.06 10*3/MM3 (ref 0.2–1.2)
NEUTROPHILS # BLD AUTO: 12.22 10*3/MM3 (ref 1.9–8.1)
NEUTROPHILS NFR BLD MANUAL: 81 % (ref 42.7–76)
PLAT MORPH BLD: NORMAL
PLATELET # BLD AUTO: 181 10*3/MM3 (ref 140–500)
PMV BLD AUTO: 10.5 FL (ref 6–12)
POTASSIUM BLD-SCNC: 3.7 MMOL/L (ref 3.5–5.2)
RBC # BLD AUTO: 4.12 10*6/MM3 (ref 4.6–6)
RBC MORPH BLD: NORMAL
SODIUM BLD-SCNC: 138 MMOL/L (ref 136–145)
WBC MORPH BLD: NORMAL
WBC NRBC COR # BLD: 15.09 10*3/MM3 (ref 4.5–10.7)

## 2019-02-10 PROCEDURE — 99024 POSTOP FOLLOW-UP VISIT: CPT | Performed by: SURGERY

## 2019-02-10 PROCEDURE — 25010000002 PIPERACILLIN SOD-TAZOBACTAM PER 1 G: Performed by: SURGERY

## 2019-02-10 PROCEDURE — 74018 RADEX ABDOMEN 1 VIEW: CPT

## 2019-02-10 PROCEDURE — 25010000002 HYDROMORPHONE PER 4 MG: Performed by: SURGERY

## 2019-02-10 PROCEDURE — 85027 COMPLETE CBC AUTOMATED: CPT | Performed by: SURGERY

## 2019-02-10 PROCEDURE — 80048 BASIC METABOLIC PNL TOTAL CA: CPT | Performed by: SURGERY

## 2019-02-10 PROCEDURE — 0D9670Z DRAINAGE OF STOMACH WITH DRAINAGE DEVICE, VIA NATURAL OR ARTIFICIAL OPENING: ICD-10-PCS | Performed by: SURGERY

## 2019-02-10 PROCEDURE — 25010000002 ONDANSETRON PER 1 MG: Performed by: SURGERY

## 2019-02-10 PROCEDURE — 25010000002 ENOXAPARIN PER 10 MG: Performed by: SURGERY

## 2019-02-10 PROCEDURE — 85007 BL SMEAR W/DIFF WBC COUNT: CPT | Performed by: SURGERY

## 2019-02-10 RX ADMIN — TAZOBACTAM SODIUM AND PIPERACILLIN SODIUM 3.38 G: 375; 3 INJECTION, SOLUTION INTRAVENOUS at 17:23

## 2019-02-10 RX ADMIN — HYDROMORPHONE HYDROCHLORIDE 0.5 MG: 1 INJECTION, SOLUTION INTRAMUSCULAR; INTRAVENOUS; SUBCUTANEOUS at 04:02

## 2019-02-10 RX ADMIN — ONDANSETRON 4 MG: 2 INJECTION INTRAMUSCULAR; INTRAVENOUS at 12:18

## 2019-02-10 RX ADMIN — ONDANSETRON 4 MG: 2 INJECTION INTRAMUSCULAR; INTRAVENOUS at 06:15

## 2019-02-10 RX ADMIN — TAZOBACTAM SODIUM AND PIPERACILLIN SODIUM 3.38 G: 375; 3 INJECTION, SOLUTION INTRAVENOUS at 08:59

## 2019-02-10 RX ADMIN — HYDROMORPHONE HYDROCHLORIDE 0.5 MG: 1 INJECTION, SOLUTION INTRAMUSCULAR; INTRAVENOUS; SUBCUTANEOUS at 16:34

## 2019-02-10 RX ADMIN — HYDROMORPHONE HYDROCHLORIDE 0.5 MG: 1 INJECTION, SOLUTION INTRAMUSCULAR; INTRAVENOUS; SUBCUTANEOUS at 01:05

## 2019-02-10 RX ADMIN — ENOXAPARIN SODIUM 40 MG: 40 INJECTION SUBCUTANEOUS at 08:59

## 2019-02-10 RX ADMIN — TAZOBACTAM SODIUM AND PIPERACILLIN SODIUM 3.38 G: 375; 3 INJECTION, SOLUTION INTRAVENOUS at 01:02

## 2019-02-10 RX ADMIN — HYDROMORPHONE HYDROCHLORIDE 0.5 MG: 1 INJECTION, SOLUTION INTRAMUSCULAR; INTRAVENOUS; SUBCUTANEOUS at 19:35

## 2019-02-10 RX ADMIN — POTASSIUM CHLORIDE, DEXTROSE MONOHYDRATE AND SODIUM CHLORIDE 100 ML/HR: 150; 5; 450 INJECTION, SOLUTION INTRAVENOUS at 12:17

## 2019-02-10 RX ADMIN — HYDROMORPHONE HYDROCHLORIDE 0.5 MG: 1 INJECTION, SOLUTION INTRAMUSCULAR; INTRAVENOUS; SUBCUTANEOUS at 08:59

## 2019-02-10 NOTE — PROGRESS NOTES
Chief Complaint:    S/P Laparoscopic appendectomy, POD 2    Subjective:    The patient is feeling worse today with persistent nausea and abdominal distention.  He has no appetite.  He has not passed flatus nor had a bowel movement.    Objective:    Temp:  [98.1 °F (36.7 °C)-98.9 °F (37.2 °C)] 98.2 °F (36.8 °C)  Heart Rate:  [71-90] 87  Resp:  [16-20] 16  BP: (123-143)/(64-83) 143/83    Physical Exam   Constitutional: He is cooperative. He appears ill. No distress.   Pulmonary/Chest: Effort normal. No respiratory distress.   Abdominal: Soft. He exhibits distension. Bowel sounds are absent. There is generalized tenderness (Appropriate postop tenderness).   Neurological: He is alert.       Results:    WBC is increased to 15.09.    Impression/Plan:    The patient is POD 2 from a laparoscopic appendectomy for perforated appendicitis.  The patient has clinical evidence of an ileus related to a functional SBO due to the inflamed terminal ileum related to the appendicitis.  He will need an NG tube.    The NG tube was placed with 1200 cc immediately returned.    Jose Pierson Jr., M.D.

## 2019-02-10 NOTE — PLAN OF CARE
"Problem: Patient Care Overview  Goal: Plan of Care Review  Outcome: Ongoing (interventions implemented as appropriate)   02/09/19 9544   Coping/Psychosocial   Plan of Care Reviewed With patient   Plan of Care Review   Progress improving   OTHER   Outcome Summary pt up walking in blackman a few times today. taking tylenol for pain. this evening was very flushed in the face, feeling uncomfortable, and pain in the abd. dilaudid given x1. walked pt and sat up in chair and states \"he felt better\". ivf's and iv abx continued. dressings are cdi. vss. will continue to monitor.       Problem: Pain, Acute (Adult)  Goal: Acceptable Pain Control/Comfort Level  Outcome: Ongoing (interventions implemented as appropriate)        "

## 2019-02-10 NOTE — PLAN OF CARE
Problem: Patient Care Overview  Goal: Plan of Care Review  Outcome: Ongoing (interventions implemented as appropriate)   02/10/19 0418   Coping/Psychosocial   Plan of Care Reviewed With patient   Plan of Care Review   Progress improving   OTHER   Outcome Summary Med multiple times for pain with dilaudid, Zofran given x 1 for nausea with com relief stated, ambulated in blackman x 1, VVS afebrile, NSR on monitor, eyes closed at short interval, resting quielty     Goal: Individualization and Mutuality  Outcome: Ongoing (interventions implemented as appropriate)    Goal: Discharge Needs Assessment  Outcome: Ongoing (interventions implemented as appropriate)    Goal: Interprofessional Rounds/Family Conf  Outcome: Ongoing (interventions implemented as appropriate)      Problem: Pain, Acute (Adult)  Goal: Acceptable Pain Control/Comfort Level  Outcome: Ongoing (interventions implemented as appropriate)

## 2019-02-11 LAB
ANION GAP SERPL CALCULATED.3IONS-SCNC: 11.9 MMOL/L
BUN BLD-MCNC: 10 MG/DL (ref 8–23)
BUN/CREAT SERPL: 12.3 (ref 7–25)
CALCIUM SPEC-SCNC: 8.5 MG/DL (ref 8.6–10.5)
CHLORIDE SERPL-SCNC: 101 MMOL/L (ref 98–107)
CO2 SERPL-SCNC: 27.1 MMOL/L (ref 22–29)
CREAT BLD-MCNC: 0.81 MG/DL (ref 0.76–1.27)
DEPRECATED RDW RBC AUTO: 44.3 FL (ref 37–54)
ERYTHROCYTE [DISTWIDTH] IN BLOOD BY AUTOMATED COUNT: 12.5 % (ref 11.5–14.5)
GFR SERPL CREATININE-BSD FRML MDRD: 94 ML/MIN/1.73
GLUCOSE BLD-MCNC: 134 MG/DL (ref 65–99)
HCT VFR BLD AUTO: 41 % (ref 40.4–52.2)
HGB BLD-MCNC: 14 G/DL (ref 13.7–17.6)
MCH RBC QN AUTO: 33.1 PG (ref 27–32.7)
MCHC RBC AUTO-ENTMCNC: 34.1 G/DL (ref 32.6–36.4)
MCV RBC AUTO: 96.9 FL (ref 79.8–96.2)
PLATELET # BLD AUTO: 209 10*3/MM3 (ref 140–500)
PMV BLD AUTO: 10.6 FL (ref 6–12)
POTASSIUM BLD-SCNC: 3.5 MMOL/L (ref 3.5–5.2)
RBC # BLD AUTO: 4.23 10*6/MM3 (ref 4.6–6)
SODIUM BLD-SCNC: 140 MMOL/L (ref 136–145)
WBC NRBC COR # BLD: 12.2 10*3/MM3 (ref 4.5–10.7)

## 2019-02-11 PROCEDURE — 80048 BASIC METABOLIC PNL TOTAL CA: CPT | Performed by: SURGERY

## 2019-02-11 PROCEDURE — 99024 POSTOP FOLLOW-UP VISIT: CPT | Performed by: SURGERY

## 2019-02-11 PROCEDURE — 25010000002 PIPERACILLIN SOD-TAZOBACTAM PER 1 G: Performed by: SURGERY

## 2019-02-11 PROCEDURE — 25010000002 HYDROMORPHONE PER 4 MG: Performed by: SURGERY

## 2019-02-11 PROCEDURE — 25010000002 ONDANSETRON PER 1 MG: Performed by: SURGERY

## 2019-02-11 PROCEDURE — 85027 COMPLETE CBC AUTOMATED: CPT | Performed by: SURGERY

## 2019-02-11 PROCEDURE — 25010000002 ENOXAPARIN PER 10 MG: Performed by: SURGERY

## 2019-02-11 RX ADMIN — ENOXAPARIN SODIUM 40 MG: 40 INJECTION SUBCUTANEOUS at 09:28

## 2019-02-11 RX ADMIN — ONDANSETRON 4 MG: 2 INJECTION INTRAMUSCULAR; INTRAVENOUS at 22:35

## 2019-02-11 RX ADMIN — HYDROMORPHONE HYDROCHLORIDE 0.5 MG: 1 INJECTION, SOLUTION INTRAMUSCULAR; INTRAVENOUS; SUBCUTANEOUS at 18:06

## 2019-02-11 RX ADMIN — TAZOBACTAM SODIUM AND PIPERACILLIN SODIUM 3.38 G: 375; 3 INJECTION, SOLUTION INTRAVENOUS at 09:28

## 2019-02-11 RX ADMIN — POTASSIUM CHLORIDE, DEXTROSE MONOHYDRATE AND SODIUM CHLORIDE 125 ML/HR: 150; 5; 450 INJECTION, SOLUTION INTRAVENOUS at 00:40

## 2019-02-11 RX ADMIN — POTASSIUM CHLORIDE, DEXTROSE MONOHYDRATE AND SODIUM CHLORIDE 100 ML/HR: 150; 5; 450 INJECTION, SOLUTION INTRAVENOUS at 22:39

## 2019-02-11 RX ADMIN — POTASSIUM CHLORIDE, DEXTROSE MONOHYDRATE AND SODIUM CHLORIDE 125 ML/HR: 150; 5; 450 INJECTION, SOLUTION INTRAVENOUS at 12:10

## 2019-02-11 RX ADMIN — HYDROMORPHONE HYDROCHLORIDE 0.5 MG: 1 INJECTION, SOLUTION INTRAMUSCULAR; INTRAVENOUS; SUBCUTANEOUS at 22:35

## 2019-02-11 RX ADMIN — TAZOBACTAM SODIUM AND PIPERACILLIN SODIUM 3.38 G: 375; 3 INJECTION, SOLUTION INTRAVENOUS at 02:43

## 2019-02-11 RX ADMIN — TAZOBACTAM SODIUM AND PIPERACILLIN SODIUM 3.38 G: 375; 3 INJECTION, SOLUTION INTRAVENOUS at 18:06

## 2019-02-11 NOTE — PLAN OF CARE
Problem: Patient Care Overview  Goal: Plan of Care Review  Outcome: Ongoing (interventions implemented as appropriate)   02/11/19 1210 02/11/19 5086   Coping/Psychosocial   Plan of Care Reviewed With patient --    Plan of Care Review   Progress --  improving   OTHER   Outcome Summary --  mild pain during shift, refused pain medication. Ambulated in blackman 3 times during shift. Minimal bowel sounds, VSS and resting in bed at this time. Will continue to monitor.       Problem: Pain, Acute (Adult)  Goal: Acceptable Pain Control/Comfort Level  Outcome: Ongoing (interventions implemented as appropriate)

## 2019-02-11 NOTE — PLAN OF CARE
Problem: Patient Care Overview  Goal: Plan of Care Review  Outcome: Ongoing (interventions implemented as appropriate)   02/11/19 0605   Coping/Psychosocial   Plan of Care Reviewed With patient   Plan of Care Review   Progress improving   OTHER   Outcome Summary Pt denies SOA, medicated 1x for abd pain. Small amt of flatus, but no stools. Continues with NGT to low wall suct. 1100mL brown output. Pt dneies nausea and tolerated ice chips. Ambulated in room with no issue.

## 2019-02-11 NOTE — PROGRESS NOTES
Continued Stay Note  [facility]     Patient Name: Ajith Dimas  MRN: [id number]  Today's Date: 2/11/2019    Admit Date: 2/8/2019    Discharge Plan     Row Name 02/11/19 1322       Plan    Plan  Plan home with significant other.   DEVI Vee RN    Patient/Family in Agreement with Plan  yes    Plan Comments  Spoke to pt and pt's significant other  (Refugio Carver [phone]) at bedside.  Pt has NG tube.  Pt has peritonitis.   CCP will follow for discharge needs.  Plan home with significant other.   SAVAGE Barrett        Discharge Codes    No documentation.             Roya Vee, RN

## 2019-02-12 LAB
ANION GAP SERPL CALCULATED.3IONS-SCNC: 12.7 MMOL/L
BASOPHILS # BLD AUTO: 0.02 10*3/MM3 (ref 0–0.2)
BASOPHILS NFR BLD AUTO: 0.2 % (ref 0–1.5)
BUN BLD-MCNC: 11 MG/DL (ref 8–23)
BUN/CREAT SERPL: 14.7 (ref 7–25)
CALCIUM SPEC-SCNC: 8.3 MG/DL (ref 8.6–10.5)
CHLORIDE SERPL-SCNC: 101 MMOL/L (ref 98–107)
CO2 SERPL-SCNC: 26.3 MMOL/L (ref 22–29)
CREAT BLD-MCNC: 0.75 MG/DL (ref 0.76–1.27)
CYTO UR: NORMAL
DEPRECATED RDW RBC AUTO: 43.5 FL (ref 37–54)
EOSINOPHIL # BLD AUTO: 0.02 10*3/MM3 (ref 0–0.4)
EOSINOPHIL NFR BLD AUTO: 0.2 % (ref 0.3–6.2)
ERYTHROCYTE [DISTWIDTH] IN BLOOD BY AUTOMATED COUNT: 11.9 % (ref 12.3–15.4)
GFR SERPL CREATININE-BSD FRML MDRD: 103 ML/MIN/1.73
GLUCOSE BLD-MCNC: 118 MG/DL (ref 65–99)
HCT VFR BLD AUTO: 39 % (ref 37.5–51)
HGB BLD-MCNC: 12.8 G/DL (ref 13–17.7)
IMM GRANULOCYTES # BLD AUTO: 0.07 10*3/MM3 (ref 0–0.05)
IMM GRANULOCYTES NFR BLD AUTO: 0.7 % (ref 0–0.5)
LAB AP CASE REPORT: NORMAL
LYMPHOCYTES # BLD AUTO: 1.12 10*3/MM3 (ref 0.7–3.1)
LYMPHOCYTES NFR BLD AUTO: 10.8 % (ref 19.6–45.3)
MAGNESIUM SERPL-MCNC: 1.9 MG/DL (ref 1.6–2.4)
MCH RBC QN AUTO: 32.1 PG (ref 26.6–33)
MCHC RBC AUTO-ENTMCNC: 32.8 G/DL (ref 31.5–35.7)
MCV RBC AUTO: 97.7 FL (ref 79–97)
MONOCYTES # BLD AUTO: 1.46 10*3/MM3 (ref 0.1–0.9)
MONOCYTES NFR BLD AUTO: 14.1 % (ref 5–12)
NEUTROPHILS # BLD AUTO: 7.69 10*3/MM3 (ref 1.4–7)
NEUTROPHILS NFR BLD AUTO: 74 % (ref 42.7–76)
NRBC BLD AUTO-RTO: 0 /100 WBC (ref 0–0)
PATH REPORT.FINAL DX SPEC: NORMAL
PATH REPORT.GROSS SPEC: NORMAL
PLATELET # BLD AUTO: 214 10*3/MM3 (ref 140–450)
PMV BLD AUTO: 10.5 FL (ref 6–12)
POTASSIUM BLD-SCNC: 3.3 MMOL/L (ref 3.5–5.2)
RBC # BLD AUTO: 3.99 10*6/MM3 (ref 4.14–5.8)
SODIUM BLD-SCNC: 140 MMOL/L (ref 136–145)
WBC NRBC COR # BLD: 10.38 10*3/MM3 (ref 3.4–10.8)

## 2019-02-12 PROCEDURE — 25010000002 HYDROMORPHONE PER 4 MG: Performed by: SURGERY

## 2019-02-12 PROCEDURE — 80048 BASIC METABOLIC PNL TOTAL CA: CPT | Performed by: SURGERY

## 2019-02-12 PROCEDURE — 25010000002 PROMETHAZINE PER 50 MG: Performed by: SURGERY

## 2019-02-12 PROCEDURE — 99024 POSTOP FOLLOW-UP VISIT: CPT | Performed by: SURGERY

## 2019-02-12 PROCEDURE — 25010000002 ENOXAPARIN PER 10 MG: Performed by: SURGERY

## 2019-02-12 PROCEDURE — 25010000002 ONDANSETRON PER 1 MG: Performed by: SURGERY

## 2019-02-12 PROCEDURE — 85025 COMPLETE CBC W/AUTO DIFF WBC: CPT | Performed by: SURGERY

## 2019-02-12 PROCEDURE — 25010000002 PIPERACILLIN SOD-TAZOBACTAM PER 1 G: Performed by: SURGERY

## 2019-02-12 PROCEDURE — 83735 ASSAY OF MAGNESIUM: CPT | Performed by: SURGERY

## 2019-02-12 RX ORDER — POTASSIUM CHLORIDE 750 MG/1
40 CAPSULE, EXTENDED RELEASE ORAL AS NEEDED
Status: DISCONTINUED | OUTPATIENT
Start: 2019-02-12 | End: 2019-02-19 | Stop reason: HOSPADM

## 2019-02-12 RX ORDER — POTASSIUM CHLORIDE 7.45 MG/ML
10 INJECTION INTRAVENOUS
Status: DISCONTINUED | OUTPATIENT
Start: 2019-02-12 | End: 2019-02-19 | Stop reason: HOSPADM

## 2019-02-12 RX ORDER — PROMETHAZINE HYDROCHLORIDE 25 MG/ML
12.5 INJECTION, SOLUTION INTRAMUSCULAR; INTRAVENOUS EVERY 6 HOURS PRN
Status: DISCONTINUED | OUTPATIENT
Start: 2019-02-12 | End: 2019-02-19 | Stop reason: HOSPADM

## 2019-02-12 RX ORDER — POTASSIUM CHLORIDE 1.5 G/1.77G
40 POWDER, FOR SOLUTION ORAL AS NEEDED
Status: DISCONTINUED | OUTPATIENT
Start: 2019-02-12 | End: 2019-02-19 | Stop reason: HOSPADM

## 2019-02-12 RX ADMIN — SODIUM CHLORIDE, PRESERVATIVE FREE 10 ML: 5 INJECTION INTRAVENOUS at 21:00

## 2019-02-12 RX ADMIN — TAZOBACTAM SODIUM AND PIPERACILLIN SODIUM 3.38 G: 375; 3 INJECTION, SOLUTION INTRAVENOUS at 02:20

## 2019-02-12 RX ADMIN — HYDROMORPHONE HYDROCHLORIDE 0.5 MG: 1 INJECTION, SOLUTION INTRAMUSCULAR; INTRAVENOUS; SUBCUTANEOUS at 10:20

## 2019-02-12 RX ADMIN — PROMETHAZINE HYDROCHLORIDE 12.5 MG: 25 INJECTION INTRAMUSCULAR; INTRAVENOUS at 20:58

## 2019-02-12 RX ADMIN — ONDANSETRON 4 MG: 2 INJECTION INTRAMUSCULAR; INTRAVENOUS at 16:47

## 2019-02-12 RX ADMIN — TAZOBACTAM SODIUM AND PIPERACILLIN SODIUM 3.38 G: 375; 3 INJECTION, SOLUTION INTRAVENOUS at 18:32

## 2019-02-12 RX ADMIN — ONDANSETRON 4 MG: 2 INJECTION INTRAMUSCULAR; INTRAVENOUS at 06:00

## 2019-02-12 RX ADMIN — HYDROMORPHONE HYDROCHLORIDE 0.5 MG: 1 INJECTION, SOLUTION INTRAMUSCULAR; INTRAVENOUS; SUBCUTANEOUS at 02:17

## 2019-02-12 RX ADMIN — HYDROMORPHONE HYDROCHLORIDE 0.5 MG: 1 INJECTION, SOLUTION INTRAMUSCULAR; INTRAVENOUS; SUBCUTANEOUS at 18:32

## 2019-02-12 RX ADMIN — ONDANSETRON 4 MG: 2 INJECTION INTRAMUSCULAR; INTRAVENOUS at 23:03

## 2019-02-12 RX ADMIN — HYDROMORPHONE HYDROCHLORIDE 0.5 MG: 1 INJECTION, SOLUTION INTRAMUSCULAR; INTRAVENOUS; SUBCUTANEOUS at 06:00

## 2019-02-12 RX ADMIN — ENOXAPARIN SODIUM 40 MG: 40 INJECTION SUBCUTANEOUS at 08:03

## 2019-02-12 RX ADMIN — POTASSIUM CHLORIDE, DEXTROSE MONOHYDRATE AND SODIUM CHLORIDE 100 ML/HR: 150; 5; 450 INJECTION, SOLUTION INTRAVENOUS at 08:03

## 2019-02-12 RX ADMIN — POTASSIUM CHLORIDE, DEXTROSE MONOHYDRATE AND SODIUM CHLORIDE 100 ML/HR: 150; 5; 450 INJECTION, SOLUTION INTRAVENOUS at 19:57

## 2019-02-12 RX ADMIN — POTASSIUM CHLORIDE 40 MEQ: 750 CAPSULE, EXTENDED RELEASE ORAL at 14:55

## 2019-02-12 RX ADMIN — HYDROMORPHONE HYDROCHLORIDE 0.5 MG: 1 INJECTION, SOLUTION INTRAMUSCULAR; INTRAVENOUS; SUBCUTANEOUS at 23:03

## 2019-02-12 RX ADMIN — HYDROMORPHONE HYDROCHLORIDE 0.5 MG: 1 INJECTION, SOLUTION INTRAMUSCULAR; INTRAVENOUS; SUBCUTANEOUS at 08:03

## 2019-02-12 RX ADMIN — HYDROMORPHONE HYDROCHLORIDE 0.5 MG: 1 INJECTION, SOLUTION INTRAMUSCULAR; INTRAVENOUS; SUBCUTANEOUS at 20:59

## 2019-02-12 RX ADMIN — HYDROMORPHONE HYDROCHLORIDE 0.5 MG: 1 INJECTION, SOLUTION INTRAMUSCULAR; INTRAVENOUS; SUBCUTANEOUS at 14:55

## 2019-02-12 NOTE — PROGRESS NOTES
Postoperative day #4    Subjective:  Says he feels better today.  Less gas and still feels tight.  Small bowel movement yesterday.  Abdominal pain improved.    Objective:  Patient is been afebrile, heart rate 80s blood pressure 153/86  Gen.: Awake alert and oriented, no acute distress  Abdomen: Distended, minimally tender, incisions look okay.    White blood cell count 10.3 hemoglobin 12.8.    Assessment and plan:  -Perforated appendicitis, now postoperative day #4  -Clinically postop ileus, continue NG for now  -If no significant improvement tomorrow consider CT, patient is certainly at high risk for postop abscess.  -Continue IV antibiotics.    Leonardo Blanca MD  General and Endoscopic Surgery  [facility]    [address], [address]  [place], [place], [address]  P: [phone]  F: [phone]

## 2019-02-12 NOTE — PLAN OF CARE
Problem: Patient Care Overview  Goal: Plan of Care Review  Outcome: Ongoing (interventions implemented as appropriate)   02/12/19 5345   Coping/Psychosocial   Plan of Care Reviewed With patient;family   Plan of Care Review   Progress no change   OTHER   Outcome Summary Pt cont with NG tube. Clears liquid in canister for the entire shift. Received a few doses of Dilaudid for abd pain. Also is getting K+ replacement PO and tolerating well. VSS. Will cont to monitor.       Problem: Pain, Acute (Adult)  Goal: Acceptable Pain Control/Comfort Level  Outcome: Ongoing (interventions implemented as appropriate)      Problem: Appendectomy (Adult)  Goal: Signs and Symptoms of Listed Potential Problems Will be Absent, Minimized or Managed (Appendectomy)  Outcome: Ongoing (interventions implemented as appropriate)    Goal: Anesthesia/Sedation Recovery  Outcome: Ongoing (interventions implemented as appropriate)      Problem: Skin Injury Risk (Adult)  Goal: Identify Related Risk Factors and Signs and Symptoms  Outcome: Ongoing (interventions implemented as appropriate)    Goal: Skin Health and Integrity  Outcome: Ongoing (interventions implemented as appropriate)      Problem: Fall Risk (Adult)  Goal: Identify Related Risk Factors and Signs and Symptoms  Outcome: Ongoing (interventions implemented as appropriate)    Goal: Absence of Fall  Outcome: Ongoing (interventions implemented as appropriate)

## 2019-02-12 NOTE — PLAN OF CARE
Problem: Patient Care Overview  Goal: Plan of Care Review  Outcome: Ongoing (interventions implemented as appropriate)   02/12/19 8387   Coping/Psychosocial   Plan of Care Reviewed With patient   OTHER   Outcome Summary Patient up to the bathroom at times,NGT to LWS draining dark reddish-brown drainage frequent irrigation to NGT,to help with suction,continue to complain of pain,treated with Dilaudid and Zofran as needed,vss will continue to monitor closely.

## 2019-02-13 ENCOUNTER — APPOINTMENT (OUTPATIENT)
Dept: CT IMAGING | Facility: HOSPITAL | Age: 74
End: 2019-02-13

## 2019-02-13 LAB
ANION GAP SERPL CALCULATED.3IONS-SCNC: 11.8 MMOL/L
BASOPHILS # BLD AUTO: 0.03 10*3/MM3 (ref 0–0.2)
BASOPHILS NFR BLD AUTO: 0.2 % (ref 0–1.5)
BUN BLD-MCNC: 10 MG/DL (ref 8–23)
BUN/CREAT SERPL: 12.5 (ref 7–25)
CALCIUM SPEC-SCNC: 8.7 MG/DL (ref 8.6–10.5)
CHLORIDE SERPL-SCNC: 101 MMOL/L (ref 98–107)
CO2 SERPL-SCNC: 27.2 MMOL/L (ref 22–29)
CREAT BLD-MCNC: 0.8 MG/DL (ref 0.76–1.27)
DEPRECATED RDW RBC AUTO: 41.7 FL (ref 37–54)
EOSINOPHIL # BLD AUTO: 0.03 10*3/MM3 (ref 0–0.4)
EOSINOPHIL NFR BLD AUTO: 0.2 % (ref 0.3–6.2)
ERYTHROCYTE [DISTWIDTH] IN BLOOD BY AUTOMATED COUNT: 12 % (ref 12.3–15.4)
GFR SERPL CREATININE-BSD FRML MDRD: 95 ML/MIN/1.73
GLUCOSE BLD-MCNC: 126 MG/DL (ref 65–99)
HCT VFR BLD AUTO: 40.4 % (ref 37.5–51)
HGB BLD-MCNC: 13.4 G/DL (ref 13–17.7)
IMM GRANULOCYTES # BLD AUTO: 0.12 10*3/MM3 (ref 0–0.05)
IMM GRANULOCYTES NFR BLD AUTO: 0.8 % (ref 0–0.5)
LYMPHOCYTES # BLD AUTO: 1.52 10*3/MM3 (ref 0.7–3.1)
LYMPHOCYTES NFR BLD AUTO: 10.7 % (ref 19.6–45.3)
MCH RBC QN AUTO: 31.6 PG (ref 26.6–33)
MCHC RBC AUTO-ENTMCNC: 33.2 G/DL (ref 31.5–35.7)
MCV RBC AUTO: 95.3 FL (ref 79–97)
MONOCYTES # BLD AUTO: 1.67 10*3/MM3 (ref 0.1–0.9)
MONOCYTES NFR BLD AUTO: 11.8 % (ref 5–12)
NEUTROPHILS # BLD AUTO: 10.84 10*3/MM3 (ref 1.4–7)
NEUTROPHILS NFR BLD AUTO: 76.3 % (ref 42.7–76)
NRBC BLD AUTO-RTO: 0 /100 WBC (ref 0–0)
PLATELET # BLD AUTO: 242 10*3/MM3 (ref 140–450)
PMV BLD AUTO: 10.2 FL (ref 6–12)
POTASSIUM BLD-SCNC: 3.9 MMOL/L (ref 3.5–5.2)
RBC # BLD AUTO: 4.24 10*6/MM3 (ref 4.14–5.8)
SODIUM BLD-SCNC: 140 MMOL/L (ref 136–145)
WBC NRBC COR # BLD: 14.21 10*3/MM3 (ref 3.4–10.8)

## 2019-02-13 PROCEDURE — 80048 BASIC METABOLIC PNL TOTAL CA: CPT | Performed by: SURGERY

## 2019-02-13 PROCEDURE — 25010000002 PROMETHAZINE PER 50 MG: Performed by: SURGERY

## 2019-02-13 PROCEDURE — 99024 POSTOP FOLLOW-UP VISIT: CPT | Performed by: SURGERY

## 2019-02-13 PROCEDURE — 25010000002 IOPAMIDOL 61 % SOLUTION: Performed by: SURGERY

## 2019-02-13 PROCEDURE — 25010000002 HYDROMORPHONE PER 4 MG: Performed by: SURGERY

## 2019-02-13 PROCEDURE — 25010000002 ENOXAPARIN PER 10 MG: Performed by: SURGERY

## 2019-02-13 PROCEDURE — 25010000002 PIPERACILLIN SOD-TAZOBACTAM PER 1 G: Performed by: SURGERY

## 2019-02-13 PROCEDURE — 85025 COMPLETE CBC W/AUTO DIFF WBC: CPT | Performed by: SURGERY

## 2019-02-13 PROCEDURE — 74177 CT ABD & PELVIS W/CONTRAST: CPT

## 2019-02-13 RX ORDER — SIMETHICONE 80 MG
80 TABLET,CHEWABLE ORAL 4 TIMES DAILY PRN
Status: DISCONTINUED | OUTPATIENT
Start: 2019-02-13 | End: 2019-02-19 | Stop reason: HOSPADM

## 2019-02-13 RX ADMIN — TAZOBACTAM SODIUM AND PIPERACILLIN SODIUM 3.38 G: 375; 3 INJECTION, SOLUTION INTRAVENOUS at 17:35

## 2019-02-13 RX ADMIN — PROMETHAZINE HYDROCHLORIDE 12.5 MG: 25 INJECTION INTRAMUSCULAR; INTRAVENOUS at 02:32

## 2019-02-13 RX ADMIN — TAZOBACTAM SODIUM AND PIPERACILLIN SODIUM 3.38 G: 375; 3 INJECTION, SOLUTION INTRAVENOUS at 02:35

## 2019-02-13 RX ADMIN — IOPAMIDOL 85 ML: 612 INJECTION, SOLUTION INTRAVENOUS at 10:27

## 2019-02-13 RX ADMIN — HYDROMORPHONE HYDROCHLORIDE 0.5 MG: 1 INJECTION, SOLUTION INTRAMUSCULAR; INTRAVENOUS; SUBCUTANEOUS at 11:16

## 2019-02-13 RX ADMIN — ENOXAPARIN SODIUM 40 MG: 40 INJECTION SUBCUTANEOUS at 12:14

## 2019-02-13 RX ADMIN — PROMETHAZINE HYDROCHLORIDE 12.5 MG: 25 INJECTION INTRAMUSCULAR; INTRAVENOUS at 09:04

## 2019-02-13 RX ADMIN — POTASSIUM CHLORIDE, DEXTROSE MONOHYDRATE AND SODIUM CHLORIDE 100 ML/HR: 150; 5; 450 INJECTION, SOLUTION INTRAVENOUS at 02:31

## 2019-02-13 RX ADMIN — HYDROMORPHONE HYDROCHLORIDE 0.5 MG: 1 INJECTION, SOLUTION INTRAMUSCULAR; INTRAVENOUS; SUBCUTANEOUS at 02:32

## 2019-02-13 RX ADMIN — TAZOBACTAM SODIUM AND PIPERACILLIN SODIUM 3.38 G: 375; 3 INJECTION, SOLUTION INTRAVENOUS at 08:45

## 2019-02-13 RX ADMIN — POTASSIUM CHLORIDE, DEXTROSE MONOHYDRATE AND SODIUM CHLORIDE 100 ML/HR: 150; 5; 450 INJECTION, SOLUTION INTRAVENOUS at 21:42

## 2019-02-13 RX ADMIN — POTASSIUM CHLORIDE, DEXTROSE MONOHYDRATE AND SODIUM CHLORIDE 100 ML/HR: 150; 5; 450 INJECTION, SOLUTION INTRAVENOUS at 12:16

## 2019-02-13 RX ADMIN — SIMETHICONE CHEW TAB 80 MG 80 MG: 80 TABLET ORAL at 20:48

## 2019-02-13 RX ADMIN — HYDROMORPHONE HYDROCHLORIDE 0.5 MG: 1 INJECTION, SOLUTION INTRAMUSCULAR; INTRAVENOUS; SUBCUTANEOUS at 08:45

## 2019-02-13 RX ADMIN — HYDROMORPHONE HYDROCHLORIDE 0.5 MG: 1 INJECTION, SOLUTION INTRAMUSCULAR; INTRAVENOUS; SUBCUTANEOUS at 05:03

## 2019-02-13 NOTE — PROGRESS NOTES
Postoperative day #5 laparoscopic appendectomy for perforated appendicitis    Subjective:  No significant change from yesterday.  Says he had another rough night, but does fill little bit better today.  No real GI function.    Objective:  No fever, heart rate 80s blood pressure 158/85 oxygenation low 90s on room air  Gen.: Awake alert and oriented, no acute distress  Abdomen: Distended but soft and only appropriately tender.  Dressings are clean.    Labs are reviewed.  White blood cell count increased a little bit today to 14.2.    Assessment and plan:  -Perforated appendicitis status post laparoscopic appendectomy, now with postop ileus.  Because of the increase in his white blood cell count and lack of clinical progress I ordered a CT of the abdomen and pelvis reviewed these films.  There is a little bit of fluid but no clear evidence for abscess and no free air is seen.  He does have a very clear small bowel ileus.  On review of the films the nasogastric tube was in the distal esophagus and the patient does seem a little bit better since it was advanced into the stomach.  We will plan to continue with his current regimen of antibiotics, nasogastric decompression and fluid resuscitation.  I'm hopeful that his ileus will resolve over the next couple of days.  I've asked him to keep moving.    Leonardo Blanca MD  General and Endoscopic Surgery  [facility]    [address], [address]  [place], [place], [address]  P: [phone]  F: [phone]

## 2019-02-13 NOTE — PROGRESS NOTES
Adult Nutrition  Assessment/PES    Patient Name:  Ajith Dimas  YOB: 1945  MRN: [id number]  Admit Date:  2/8/2019    Assessment Date:  2/13/2019    Comments:  Assessment triggered d/t NPO status x 3 days.  POD#5 s/p lap appendectomy d/t perforate appendicitis.  NGT to LWS.  Per chart, some abdominal discomfort and nausea.    If unable to advance diet soon, would recommend alternate means of nutrition.    RD to continue to follow.    Reason for Assessment     Row Name 02/13/19 1532          Reason for Assessment    Reason For Assessment  identified at risk by screening criteria     Diagnosis  cardiac disease;gastrointestinal disease DVT, HTN, hernia; adm with acute appendicitis     Identified At Risk by Screening Criteria  other (see comments) NPO x 3 days         Nutrition/Diet History     Row Name 02/13/19 1532          Nutrition/Diet History    Typical Food/Fluid Intake  NPO x 3 days, POD#5 s/p lap appendectomy d/t perf appendicitis; NGT to LWS         Anthropometrics     Row Name 02/13/19 1533          Admit Weight    Admit Weight  -- 202# 2/8        Body Mass Index (BMI)    BMI Assessment  BMI 25-29.9: overweight         Labs/Tests/Procedures/Meds     Row Name 02/13/19 1533          Labs/Procedures/Meds    Lab Results Reviewed  reviewed, pertinent     Lab Results Comments  Gluc, WBC        Diagnostic Tests/Procedures    Diagnostic Test/Procedure Reviewed  reviewed, pertinent     Diagnostic Test/Procedures Comments  s/p lap appendectomy (POD#5)        Medications    Pertinent Medications Reviewed  reviewed, pertinent     Pertinent Medications Comments  zosyn         Physical Findings     Row Name 02/13/19 1534          Physical Findings    Overall Physical Appearance  overweight     Tubes  nasogastric tube NGT to LWS     Skin  other (see comments) B=19, abd inc         Estimated/Assessed Needs     Row Name 02/13/19 1534          Calculation Measurements    Weight Used For Calculations  91.7 kg  (202 lb 2.6 oz)        Estimated/Assessed Needs    Additional Documentation  KCAL/KG (Group);Protein Requirements (Group);Fluid Requirements (Group)        KCAL/KG    14 Kcal/Kg (kcal)  1283.8     15 Kcal/Kg (kcal)  1375.5     18 Kcal/Kg (kcal)  1650.6     20 Kcal/Kg (kcal)  1834     25 Kcal/Kg (kcal)  2292.5     30 Kcal/Kg (kcal)  2751     35 Kcal/Kg (kcal)  3209.5     40 Kcal/Kg (kcal)  3668     45 Kcal/Kg (kcal)  4126.5     50 Kcal/Kg (kcal)  4585     kcal/kg (Specify)  -- 8227-1509        Sedgwick-St. Jeor Equation    RMR (Sedgwick-St. Jeor Equation)  1757.63        Protein Requirements    Est Protein Requirement Amount (gms/kg)  1.0 gm protein 73-92     Estimated Protein Requirements (gms/day)  91.7        Fluid Requirements    Estimated Fluid Requirements (mL/day)  2293     Estimated Fluid Requirement Method  RDA Method     RDA Method (mL)  2293     Tyree-Lucia Method (over 20 kg)  3334         Nutrition Prescription Ordered     Row Name 02/13/19 1534          Nutrition Prescription PO    Current PO Diet  NPO         Evaluation of Received Nutrient/Fluid Intake     Row Name 02/13/19 1534          Calculation Measurements    Weight Used For Calculations  91.7 kg (202 lb 2.6 oz)         Evaluation of Prescribed Nutrient/Fluid Intake     Row Name 02/13/19 1534          Calculation Measurements    Weight Used For Calculations  91.7 kg (202 lb 2.6 oz)             Problem/Interventions:  Problem 1     Row Name 02/13/19 1535          Nutrition Diagnoses Problem 1    Problem 1  Inadequate Intake/Infusion     Inadequate Intake Type  Oral     Macronutrient  Kcal;Protein     Etiology (related to)  Medical Diagnosis     Gastrointestinal  Other (comment) s/p lap appendectomy, post op ileus     Signs/Symptoms (evidenced by)  NPO                 Intervention Goal     Row Name 02/13/19 1535          Intervention Goal    General  Maintain nutrition;Disease management/therapy;Meet nutritional needs for age/condition     PO   Initiate feeding     Weight  No significant weight loss         Nutrition Intervention     Row Name 02/13/19 1540          Nutrition Intervention    RD/Tech Action  Follow Tx progress;Care plan reviewd;Await begin PO           Education/Evaluation     Row Name 02/13/19 1540          Education    Education  Will Instruct as appropriate        Monitor/Evaluation    Monitor  Per protocol;I&O;Pertinent labs;Weight;Skin status;Symptoms     Education Follow-up  Reinforce PRN           Electronically signed by:  Alayna Auguste RD  02/13/19 3:40 PM

## 2019-02-13 NOTE — NURSING NOTE
After CT of Abdomen was reviewed, the NG tube was in the esophagus and Dr. Blanca asked that I advance the tube 10 cm.  NG tube is now draining stomach contents(green in color) into canister and stomach is now soft and not taught.

## 2019-02-13 NOTE — PROGRESS NOTES
Continued Stay Note  [facility]     Patient Name: Ajith Dimas  MRN: [id number]  Today's Date: 2/13/2019    Admit Date: 2/8/2019    Discharge Plan     Row Name 02/13/19 9070       Plan    Plan  Plan home with Significant Other.  SAVAGE Barrett    Patient/Family in Agreement with Plan  yes    Plan Comments  Spoke to pt and pt's significant other  (Refugio Carver [phone]) at bedside.  Pt's NG remain in place.  Pt denies any discharge needs.  Plan home.  SAVAGE Barrett        Discharge Codes    No documentation.             Roya Vee, RN

## 2019-02-14 PROCEDURE — 99024 POSTOP FOLLOW-UP VISIT: CPT | Performed by: SURGERY

## 2019-02-14 PROCEDURE — 25010000002 PIPERACILLIN SOD-TAZOBACTAM PER 1 G: Performed by: SURGERY

## 2019-02-14 PROCEDURE — 25010000002 ENOXAPARIN PER 10 MG: Performed by: SURGERY

## 2019-02-14 RX ADMIN — ENOXAPARIN SODIUM 40 MG: 40 INJECTION SUBCUTANEOUS at 10:04

## 2019-02-14 RX ADMIN — TAZOBACTAM SODIUM AND PIPERACILLIN SODIUM 3.38 G: 375; 3 INJECTION, SOLUTION INTRAVENOUS at 02:04

## 2019-02-14 RX ADMIN — POTASSIUM CHLORIDE, DEXTROSE MONOHYDRATE AND SODIUM CHLORIDE 100 ML/HR: 150; 5; 450 INJECTION, SOLUTION INTRAVENOUS at 17:49

## 2019-02-14 RX ADMIN — TAZOBACTAM SODIUM AND PIPERACILLIN SODIUM 3.38 G: 375; 3 INJECTION, SOLUTION INTRAVENOUS at 10:04

## 2019-02-14 RX ADMIN — POTASSIUM CHLORIDE, DEXTROSE MONOHYDRATE AND SODIUM CHLORIDE 100 ML/HR: 150; 5; 450 INJECTION, SOLUTION INTRAVENOUS at 07:51

## 2019-02-14 RX ADMIN — TAZOBACTAM SODIUM AND PIPERACILLIN SODIUM 3.38 G: 375; 3 INJECTION, SOLUTION INTRAVENOUS at 17:49

## 2019-02-14 RX ADMIN — SIMETHICONE CHEW TAB 80 MG 80 MG: 80 TABLET ORAL at 05:00

## 2019-02-14 NOTE — PLAN OF CARE
Problem: Patient Care Overview  Goal: Plan of Care Review  Outcome: Ongoing (interventions implemented as appropriate)   02/14/19 1715   Coping/Psychosocial   Plan of Care Reviewed With patient   Plan of Care Review   Progress improving   OTHER   Outcome Summary Bowel sounds active this pm. NG d/c'd. BM x 2 today. No prn reqs. Amb in hallway several times. NSR.       Problem: Pain, Acute (Adult)  Goal: Acceptable Pain Control/Comfort Level  Outcome: Ongoing (interventions implemented as appropriate)      Problem: Appendectomy (Adult)  Goal: Signs and Symptoms of Listed Potential Problems Will be Absent, Minimized or Managed (Appendectomy)  Outcome: Ongoing (interventions implemented as appropriate)   02/14/19 1715   Goal/Outcome Evaluation   Problems Assessed (Appendectomy) all   Problems Present (Appendectomy) situational response     Goal: Anesthesia/Sedation Recovery  Outcome: Outcome(s) achieved Date Met: 02/14/19      Problem: Skin Injury Risk (Adult)  Goal: Identify Related Risk Factors and Signs and Symptoms  Outcome: Ongoing (interventions implemented as appropriate)   02/14/19 1715   Skin Injury Risk (Adult)   Related Risk Factors (Skin Injury Risk) fluid intake inadequate;nutritional deficiencies     Goal: Skin Health and Integrity  Outcome: Ongoing (interventions implemented as appropriate)      Problem: Fall Risk (Adult)  Goal: Identify Related Risk Factors and Signs and Symptoms  Outcome: Outcome(s) achieved Date Met: 02/14/19    Goal: Absence of Fall  Outcome: Ongoing (interventions implemented as appropriate)

## 2019-02-14 NOTE — PLAN OF CARE
Problem: Patient Care Overview  Goal: Plan of Care Review  Outcome: Ongoing (interventions implemented as appropriate)   02/14/19 0408   Coping/Psychosocial   Plan of Care Reviewed With patient   Plan of Care Review   Progress no change   OTHER   Outcome Summary NG tube patent, no c/o nausea, simethicone given for gas pain with relief stated, eyes closed at long interval, resting quietly     Goal: Individualization and Mutuality  Outcome: Ongoing (interventions implemented as appropriate)    Goal: Discharge Needs Assessment  Outcome: Ongoing (interventions implemented as appropriate)    Goal: Interprofessional Rounds/Family Conf  Outcome: Ongoing (interventions implemented as appropriate)      Problem: Pain, Acute (Adult)  Goal: Acceptable Pain Control/Comfort Level  Outcome: Ongoing (interventions implemented as appropriate)      Problem: Appendectomy (Adult)  Goal: Signs and Symptoms of Listed Potential Problems Will be Absent, Minimized or Managed (Appendectomy)  Outcome: Ongoing (interventions implemented as appropriate)    Goal: Anesthesia/Sedation Recovery  Outcome: Ongoing (interventions implemented as appropriate)      Problem: Skin Injury Risk (Adult)  Goal: Identify Related Risk Factors and Signs and Symptoms  Outcome: Ongoing (interventions implemented as appropriate)    Goal: Skin Health and Integrity  Outcome: Ongoing (interventions implemented as appropriate)      Problem: Fall Risk (Adult)  Goal: Absence of Fall  Outcome: Ongoing (interventions implemented as appropriate)

## 2019-02-14 NOTE — PROGRESS NOTES
Postop day #6 laparoscopic appendectomy for perforated appendicitis    Subjective:  Overall better.  Passing gas and had a small bowel movement this morning.  Still feels a little bit tight.  Minimal pain.    Objective:  Patient has been afebrile, vitals are stable  Gen.: Awake alert and oriented, no acute distress  Still distended, but seems slightly softer.  Minimal tenderness.    Assessment and plan:  -Perforated appendicitis status post laparoscopic appendectomy.  -Postop ileus, clinically improving with bowel movement this morning.  -Plan to clamp nasogastric tube and possibly removed later today if he is doing well.  -Continue antibiotics, finish the current course, to be discontinued tomorrow.  -Consider starting diet tomorrow if he is doing well.    Leonardo Blanca MD  General and Endoscopic Surgery  [facility]    [address], [address]  [place], [place], [address]  P: [phone]  F: [phone]

## 2019-02-15 LAB
ANION GAP SERPL CALCULATED.3IONS-SCNC: 13.1 MMOL/L
BASOPHILS # BLD AUTO: 0.01 10*3/MM3 (ref 0–0.2)
BASOPHILS NFR BLD AUTO: 0.1 % (ref 0–1.5)
BUN BLD-MCNC: 8 MG/DL (ref 8–23)
BUN/CREAT SERPL: 13.6 (ref 7–25)
CALCIUM SPEC-SCNC: 8.7 MG/DL (ref 8.6–10.5)
CHLORIDE SERPL-SCNC: 99 MMOL/L (ref 98–107)
CO2 SERPL-SCNC: 21.9 MMOL/L (ref 22–29)
CREAT BLD-MCNC: 0.59 MG/DL (ref 0.76–1.27)
DEPRECATED RDW RBC AUTO: 40.9 FL (ref 37–54)
EOSINOPHIL # BLD AUTO: 0.05 10*3/MM3 (ref 0–0.4)
EOSINOPHIL NFR BLD AUTO: 0.5 % (ref 0.3–6.2)
ERYTHROCYTE [DISTWIDTH] IN BLOOD BY AUTOMATED COUNT: 11.8 % (ref 12.3–15.4)
GFR SERPL CREATININE-BSD FRML MDRD: 135 ML/MIN/1.73
GLUCOSE BLD-MCNC: 129 MG/DL (ref 65–99)
HCT VFR BLD AUTO: 37.1 % (ref 37.5–51)
HGB BLD-MCNC: 12.8 G/DL (ref 13–17.7)
IMM GRANULOCYTES # BLD AUTO: 0.06 10*3/MM3 (ref 0–0.05)
IMM GRANULOCYTES NFR BLD AUTO: 0.6 % (ref 0–0.5)
LYMPHOCYTES # BLD AUTO: 1.38 10*3/MM3 (ref 0.7–3.1)
LYMPHOCYTES NFR BLD AUTO: 13.2 % (ref 19.6–45.3)
MCH RBC QN AUTO: 32.3 PG (ref 26.6–33)
MCHC RBC AUTO-ENTMCNC: 34.5 G/DL (ref 31.5–35.7)
MCV RBC AUTO: 93.7 FL (ref 79–97)
MONOCYTES # BLD AUTO: 0.99 10*3/MM3 (ref 0.1–0.9)
MONOCYTES NFR BLD AUTO: 9.5 % (ref 5–12)
NEUTROPHILS # BLD AUTO: 7.96 10*3/MM3 (ref 1.4–7)
NEUTROPHILS NFR BLD AUTO: 76.1 % (ref 42.7–76)
NRBC BLD AUTO-RTO: 0 /100 WBC (ref 0–0)
PLATELET # BLD AUTO: 263 10*3/MM3 (ref 140–450)
PMV BLD AUTO: 10.6 FL (ref 6–12)
POTASSIUM BLD-SCNC: 3.4 MMOL/L (ref 3.5–5.2)
POTASSIUM BLD-SCNC: 3.7 MMOL/L (ref 3.5–5.2)
RBC # BLD AUTO: 3.96 10*6/MM3 (ref 4.14–5.8)
SODIUM BLD-SCNC: 134 MMOL/L (ref 136–145)
WBC NRBC COR # BLD: 10.45 10*3/MM3 (ref 3.4–10.8)

## 2019-02-15 PROCEDURE — 25010000003 POTASSIUM CHLORIDE 10 MEQ/100ML SOLUTION: Performed by: SURGERY

## 2019-02-15 PROCEDURE — 80048 BASIC METABOLIC PNL TOTAL CA: CPT | Performed by: SURGERY

## 2019-02-15 PROCEDURE — 25010000002 PIPERACILLIN SOD-TAZOBACTAM PER 1 G: Performed by: SURGERY

## 2019-02-15 PROCEDURE — 25010000002 ONDANSETRON PER 1 MG: Performed by: SURGERY

## 2019-02-15 PROCEDURE — 25010000002 ENOXAPARIN PER 10 MG: Performed by: SURGERY

## 2019-02-15 PROCEDURE — 85025 COMPLETE CBC W/AUTO DIFF WBC: CPT | Performed by: SURGERY

## 2019-02-15 PROCEDURE — 84132 ASSAY OF SERUM POTASSIUM: CPT | Performed by: SURGERY

## 2019-02-15 PROCEDURE — 99024 POSTOP FOLLOW-UP VISIT: CPT | Performed by: SURGERY

## 2019-02-15 RX ORDER — FAMOTIDINE 20 MG/1
20 TABLET, FILM COATED ORAL 2 TIMES DAILY
Status: DISCONTINUED | OUTPATIENT
Start: 2019-02-15 | End: 2019-02-19 | Stop reason: HOSPADM

## 2019-02-15 RX ADMIN — POTASSIUM CHLORIDE 10 MEQ: 7.46 INJECTION, SOLUTION INTRAVENOUS at 10:42

## 2019-02-15 RX ADMIN — SIMETHICONE CHEW TAB 80 MG 80 MG: 80 TABLET ORAL at 16:31

## 2019-02-15 RX ADMIN — FAMOTIDINE 20 MG: 20 TABLET, FILM COATED ORAL at 21:07

## 2019-02-15 RX ADMIN — POTASSIUM CHLORIDE, DEXTROSE MONOHYDRATE AND SODIUM CHLORIDE 100 ML/HR: 150; 5; 450 INJECTION, SOLUTION INTRAVENOUS at 23:36

## 2019-02-15 RX ADMIN — POTASSIUM CHLORIDE, DEXTROSE MONOHYDRATE AND SODIUM CHLORIDE 100 ML/HR: 150; 5; 450 INJECTION, SOLUTION INTRAVENOUS at 02:42

## 2019-02-15 RX ADMIN — POTASSIUM CHLORIDE 10 MEQ: 7.46 INJECTION, SOLUTION INTRAVENOUS at 09:34

## 2019-02-15 RX ADMIN — ENOXAPARIN SODIUM 40 MG: 40 INJECTION SUBCUTANEOUS at 08:00

## 2019-02-15 RX ADMIN — POTASSIUM CHLORIDE 10 MEQ: 7.46 INJECTION, SOLUTION INTRAVENOUS at 07:59

## 2019-02-15 RX ADMIN — POTASSIUM CHLORIDE, DEXTROSE MONOHYDRATE AND SODIUM CHLORIDE 100 ML/HR: 150; 5; 450 INJECTION, SOLUTION INTRAVENOUS at 13:26

## 2019-02-15 RX ADMIN — SIMETHICONE CHEW TAB 80 MG 80 MG: 80 TABLET ORAL at 06:57

## 2019-02-15 RX ADMIN — FAMOTIDINE 20 MG: 20 TABLET, FILM COATED ORAL at 09:34

## 2019-02-15 RX ADMIN — ONDANSETRON 4 MG: 2 INJECTION INTRAMUSCULAR; INTRAVENOUS at 02:42

## 2019-02-15 RX ADMIN — ONDANSETRON 4 MG: 2 INJECTION INTRAMUSCULAR; INTRAVENOUS at 17:36

## 2019-02-15 RX ADMIN — ACETAMINOPHEN 650 MG: 325 TABLET, FILM COATED ORAL at 14:35

## 2019-02-15 RX ADMIN — POTASSIUM CHLORIDE 10 MEQ: 7.46 INJECTION, SOLUTION INTRAVENOUS at 11:55

## 2019-02-15 RX ADMIN — TAZOBACTAM SODIUM AND PIPERACILLIN SODIUM 3.38 G: 375; 3 INJECTION, SOLUTION INTRAVENOUS at 01:15

## 2019-02-15 RX ADMIN — SIMETHICONE CHEW TAB 80 MG 80 MG: 80 TABLET ORAL at 01:14

## 2019-02-15 NOTE — PLAN OF CARE
Problem: Patient Care Overview  Goal: Plan of Care Review  Outcome: Ongoing (interventions implemented as appropriate)   02/15/19 5374   Coping/Psychosocial   Plan of Care Reviewed With patient   OTHER   Outcome Summary pt tolerating clear liquids, given ivf, potassium replaced per protocol, medicated for heartburn and gas, ambulated in hallway, vss, will ctm.     Goal: Individualization and Mutuality  Outcome: Ongoing (interventions implemented as appropriate)    Goal: Discharge Needs Assessment  Outcome: Ongoing (interventions implemented as appropriate)    Goal: Interprofessional Rounds/Family Conf  Outcome: Ongoing (interventions implemented as appropriate)      Problem: Pain, Acute (Adult)  Goal: Acceptable Pain Control/Comfort Level  Outcome: Ongoing (interventions implemented as appropriate)      Problem: Skin Injury Risk (Adult)  Goal: Identify Related Risk Factors and Signs and Symptoms  Outcome: Ongoing (interventions implemented as appropriate)    Goal: Skin Health and Integrity  Outcome: Ongoing (interventions implemented as appropriate)      Problem: Fall Risk (Adult)  Goal: Absence of Fall  Outcome: Ongoing (interventions implemented as appropriate)

## 2019-02-15 NOTE — PLAN OF CARE
Problem: Patient Care Overview  Goal: Plan of Care Review  Outcome: Ongoing (interventions implemented as appropriate)   02/15/19 0434   Coping/Psychosocial   Plan of Care Reviewed With patient   Plan of Care Review   Progress improving   OTHER   Outcome Summary diet increased to clear liquids, sip of cranberry juice, c/o gas, simethacone given with some relief stated, became nauseated early am, sm amount of emesis, zofran given with good results, moderate brown bm this shift, Eyes closed at short interval, resting quielty, NSR on monitor     Goal: Individualization and Mutuality  Outcome: Ongoing (interventions implemented as appropriate)    Goal: Discharge Needs Assessment  Outcome: Ongoing (interventions implemented as appropriate)    Goal: Interprofessional Rounds/Family Conf  Outcome: Ongoing (interventions implemented as appropriate)      Problem: Pain, Acute (Adult)  Goal: Acceptable Pain Control/Comfort Level  Outcome: Ongoing (interventions implemented as appropriate)      Problem: Appendectomy (Adult)  Goal: Signs and Symptoms of Listed Potential Problems Will be Absent, Minimized or Managed (Appendectomy)  Outcome: Ongoing (interventions implemented as appropriate)      Problem: Skin Injury Risk (Adult)  Goal: Identify Related Risk Factors and Signs and Symptoms  Outcome: Ongoing (interventions implemented as appropriate)    Goal: Skin Health and Integrity  Outcome: Ongoing (interventions implemented as appropriate)      Problem: Fall Risk (Adult)  Goal: Absence of Fall  Outcome: Ongoing (interventions implemented as appropriate)

## 2019-02-15 NOTE — PROGRESS NOTES
Continued Stay Note  [facility]     Patient Name: Ajith Dimas  MRN: [id number]  Today's Date: 2/15/2019    Admit Date: 2/8/2019    Discharge Plan     Row Name 02/15/19 1037       Plan    Plan  Plan home with Significant Other.  SAVAGE Barrett    Patient/Family in Agreement with Plan  yes    Plan Comments  Spoke to pt at bedside.  Pt denies any needs.  Plan home with significant other.  SAVAGE Barrett        Discharge Codes    No documentation.             Roya Vee RN

## 2019-02-16 ENCOUNTER — APPOINTMENT (OUTPATIENT)
Dept: GENERAL RADIOLOGY | Facility: HOSPITAL | Age: 74
End: 2019-02-16

## 2019-02-16 PROCEDURE — 74019 RADEX ABDOMEN 2 VIEWS: CPT

## 2019-02-16 PROCEDURE — 25010000002 ENOXAPARIN PER 10 MG: Performed by: SURGERY

## 2019-02-16 PROCEDURE — 99024 POSTOP FOLLOW-UP VISIT: CPT | Performed by: SURGERY

## 2019-02-16 RX ADMIN — ENOXAPARIN SODIUM 40 MG: 40 INJECTION SUBCUTANEOUS at 08:54

## 2019-02-16 RX ADMIN — FAMOTIDINE 20 MG: 20 TABLET, FILM COATED ORAL at 08:54

## 2019-02-16 RX ADMIN — POTASSIUM CHLORIDE, DEXTROSE MONOHYDRATE AND SODIUM CHLORIDE 100 ML/HR: 150; 5; 450 INJECTION, SOLUTION INTRAVENOUS at 20:52

## 2019-02-16 RX ADMIN — FAMOTIDINE 20 MG: 20 TABLET, FILM COATED ORAL at 20:52

## 2019-02-16 NOTE — PROGRESS NOTES
Postoperative day #8 laparoscopic appendectomy for perforated appendicitis    Subjective:  Feels okay today.  Had 3 bowel movements overnight.  Tolerated soup this morning without nausea or emesis, but with nausea yesterday.      Objective:  Abdomen: Distended, Minimal tenderness.    Assessment and plan:  -Acute appendicitis, perforated with abscess, now postop day 8  -Postop ileus, now having a few bowel movements   -No acute evidence of abscess or infection at this time.  CT done 3 days ago showed a little bit of fluid but no clear abscess collection.    -Furthermore, if he does not seem to turn around his ileus in the next couple of days then I think that TPN would be reasonable.     xrays read as worsening, with lots of small bowel gas and colon gas.  I don't see much difference.      Estrella Kumar MD  General and Endoscopic Surgery  [facility]    [address], [address]  [place], [place], [address]  P: [phone]  F: [phone]

## 2019-02-16 NOTE — PROGRESS NOTES
Postoperative day #7 laparoscopic appendectomy for perforated appendicitis    Subjective:  Feels okay today.  Had 2 bowel movements this morning but not passing much gas.  Nasogastric tube was removed yesterday.  He complains of some reflux, which really sounds like bile trying to reflux into the back of his throat.  Took a little bit of clear liquids but became nauseated.  Walking around well and minimal pain.    Objective:  Temperature 98.9 heart rate 80 blood pressure 154/86  Gen.: Awake alert and oriented, no acute distress  Abdomen: Distended, really unchanged from yesterday.  Some tympany to percussion.  Incisions look okay.  Minimal tenderness.    White blood cell count 10.4, hemoglobin 12.8.  Chemistries look reasonable.    Assessment and plan:  -Acute appendicitis, perforated with abscess, now postop day 7.  -Postop ileus, now having a few bowel movements but still seems distended and minimal gas.  NG removed and some nausea, may need to have it replaced if he vomits again.  -No acute evidence of abscess or infection at this time.  CT done 2 days ago showed a little bit of fluid but no clear abscess collection.  There was a clear ileus demonstrated.  I'm going to let his antibiotics  after today.  I think it is white count increases again repeating his CT could be reasonable.  He is certainly at high risk for abscess although we don't see that to this point.  -Furthermore, if he does not seem to turn around his ileus in the next couple of days then I think that TPN would be reasonable.  Certainly if his nasogastric tube has to be replaced I think he should be started on TPN.    Leonardo Blanca MD  General and Endoscopic Surgery  [facility]    [address], [address]  [place], [place], [address]  P: [phone]  F: [phone]

## 2019-02-16 NOTE — PLAN OF CARE
Problem: Patient Care Overview  Goal: Plan of Care Review  Outcome: Ongoing (interventions implemented as appropriate)   02/16/19 1526   Coping/Psychosocial   Plan of Care Reviewed With patient   Plan of Care Review   Progress improving   OTHER   Outcome Summary bmx2 loose brown with sediment, denies pain and nausea, vss. ambulated in hallway twice. will continue to monitor       Problem: Pain, Acute (Adult)  Goal: Acceptable Pain Control/Comfort Level  Outcome: Ongoing (interventions implemented as appropriate)      Problem: Appendectomy (Adult)  Goal: Signs and Symptoms of Listed Potential Problems Will be Absent, Minimized or Managed (Appendectomy)  Outcome: Ongoing (interventions implemented as appropriate)      Problem: Skin Injury Risk (Adult)  Goal: Skin Health and Integrity  Outcome: Ongoing (interventions implemented as appropriate)      Problem: Fall Risk (Adult)  Goal: Absence of Fall  Outcome: Ongoing (interventions implemented as appropriate)

## 2019-02-17 LAB
ANION GAP SERPL CALCULATED.3IONS-SCNC: 11.4 MMOL/L
BASOPHILS # BLD AUTO: 0.01 10*3/MM3 (ref 0–0.2)
BASOPHILS NFR BLD AUTO: 0.1 % (ref 0–1.5)
BUN BLD-MCNC: 7 MG/DL (ref 8–23)
BUN/CREAT SERPL: 10 (ref 7–25)
CALCIUM SPEC-SCNC: 8.4 MG/DL (ref 8.6–10.5)
CHLORIDE SERPL-SCNC: 103 MMOL/L (ref 98–107)
CO2 SERPL-SCNC: 22.6 MMOL/L (ref 22–29)
CREAT BLD-MCNC: 0.7 MG/DL (ref 0.76–1.27)
DEPRECATED RDW RBC AUTO: 40.9 FL (ref 37–54)
EOSINOPHIL # BLD AUTO: 0.08 10*3/MM3 (ref 0–0.4)
EOSINOPHIL NFR BLD AUTO: 0.8 % (ref 0.3–6.2)
ERYTHROCYTE [DISTWIDTH] IN BLOOD BY AUTOMATED COUNT: 11.8 % (ref 12.3–15.4)
GFR SERPL CREATININE-BSD FRML MDRD: 111 ML/MIN/1.73
GLUCOSE BLD-MCNC: 102 MG/DL (ref 65–99)
HCT VFR BLD AUTO: 35.5 % (ref 37.5–51)
HGB BLD-MCNC: 12 G/DL (ref 13–17.7)
IMM GRANULOCYTES # BLD AUTO: 0.05 10*3/MM3 (ref 0–0.05)
IMM GRANULOCYTES NFR BLD AUTO: 0.5 % (ref 0–0.5)
LYMPHOCYTES # BLD AUTO: 1.51 10*3/MM3 (ref 0.7–3.1)
LYMPHOCYTES NFR BLD AUTO: 16 % (ref 19.6–45.3)
MCH RBC QN AUTO: 31.9 PG (ref 26.6–33)
MCHC RBC AUTO-ENTMCNC: 33.8 G/DL (ref 31.5–35.7)
MCV RBC AUTO: 94.4 FL (ref 79–97)
MONOCYTES # BLD AUTO: 0.96 10*3/MM3 (ref 0.1–0.9)
MONOCYTES NFR BLD AUTO: 10.2 % (ref 5–12)
NEUTROPHILS # BLD AUTO: 6.83 10*3/MM3 (ref 1.4–7)
NEUTROPHILS NFR BLD AUTO: 72.4 % (ref 42.7–76)
NRBC BLD AUTO-RTO: 0 /100 WBC (ref 0–0)
PLATELET # BLD AUTO: 290 10*3/MM3 (ref 140–450)
PMV BLD AUTO: 10.2 FL (ref 6–12)
POTASSIUM BLD-SCNC: 3.5 MMOL/L (ref 3.5–5.2)
RBC # BLD AUTO: 3.76 10*6/MM3 (ref 4.14–5.8)
SODIUM BLD-SCNC: 137 MMOL/L (ref 136–145)
WBC NRBC COR # BLD: 9.44 10*3/MM3 (ref 3.4–10.8)

## 2019-02-17 PROCEDURE — 85025 COMPLETE CBC W/AUTO DIFF WBC: CPT | Performed by: SURGERY

## 2019-02-17 PROCEDURE — 99024 POSTOP FOLLOW-UP VISIT: CPT | Performed by: SURGERY

## 2019-02-17 PROCEDURE — 80048 BASIC METABOLIC PNL TOTAL CA: CPT | Performed by: SURGERY

## 2019-02-17 PROCEDURE — 25010000002 ENOXAPARIN PER 10 MG: Performed by: SURGERY

## 2019-02-17 RX ADMIN — POTASSIUM CHLORIDE 40 MEQ: 750 CAPSULE, EXTENDED RELEASE ORAL at 14:15

## 2019-02-17 RX ADMIN — POTASSIUM CHLORIDE, DEXTROSE MONOHYDRATE AND SODIUM CHLORIDE 100 ML/HR: 150; 5; 450 INJECTION, SOLUTION INTRAVENOUS at 05:13

## 2019-02-17 RX ADMIN — ENOXAPARIN SODIUM 40 MG: 40 INJECTION SUBCUTANEOUS at 08:40

## 2019-02-17 RX ADMIN — FAMOTIDINE 20 MG: 20 TABLET, FILM COATED ORAL at 21:19

## 2019-02-17 RX ADMIN — POTASSIUM CHLORIDE 40 MEQ: 750 CAPSULE, EXTENDED RELEASE ORAL at 08:40

## 2019-02-17 RX ADMIN — FAMOTIDINE 20 MG: 20 TABLET, FILM COATED ORAL at 08:40

## 2019-02-17 RX ADMIN — POTASSIUM CHLORIDE, DEXTROSE MONOHYDRATE AND SODIUM CHLORIDE 50 ML/HR: 150; 5; 450 INJECTION, SOLUTION INTRAVENOUS at 22:08

## 2019-02-17 RX ADMIN — SODIUM CHLORIDE, PRESERVATIVE FREE 3 ML: 5 INJECTION INTRAVENOUS at 21:19

## 2019-02-17 NOTE — PROGRESS NOTES
Postoperative day #9 laparoscopic appendectomy for perforated appendicitis    Subjective:  Feels okay today.  Had 7 bowel movements over last 24 days.  tolerated soup this morning without nausea or emesis, and feels less distended.    Objective:  Abdomen: less distended, Minimal tenderness.    Assessment and plan:  -Acute appendicitis, perforated with abscess, now postop day 9  -Postop ileus, now with return of bowel function.   -No acute evidence of abscess or infection at this time.  CT done 4 days ago showed a little bit of fluid but no clear abscess collection.    -looks less likely for TPN    xrays read as worsening, with lots of small bowel gas and colon gas yeserday.  I didin''t see much difference.  WBC normal today    Advance diet.  Decrease IVF.    Estrella Kumar MD  General and Endoscopic Surgery  [facility]    [address], [address]  [place], [place], [address]  P: [phone]  F: [phone]

## 2019-02-17 NOTE — PLAN OF CARE
Problem: Patient Care Overview  Goal: Plan of Care Review  Outcome: Ongoing (interventions implemented as appropriate)   02/17/19 0306   Coping/Psychosocial   Plan of Care Reviewed With patient   Plan of Care Review   Progress improving   OTHER   Outcome Summary Pt had no c/o pain or discomfort this shift. Pt continues to have loose stools. Pt ambulated x3 around nurse station. Continues IVF and clear liquid diet. Lap sites clean/ dry/ intact. X-ray planned for am. VSS. Will continue to monitor.       Problem: Pain, Acute (Adult)  Goal: Acceptable Pain Control/Comfort Level  Outcome: Ongoing (interventions implemented as appropriate)      Problem: Skin Injury Risk (Adult)  Goal: Skin Health and Integrity  Outcome: Ongoing (interventions implemented as appropriate)      Problem: Fall Risk (Adult)  Goal: Absence of Fall  Outcome: Ongoing (interventions implemented as appropriate)

## 2019-02-17 NOTE — PLAN OF CARE
Problem: Patient Care Overview  Goal: Plan of Care Review  Outcome: Ongoing (interventions implemented as appropriate)   02/17/19 1703   Coping/Psychosocial   Plan of Care Reviewed With patient   Plan of Care Review   Progress no change   OTHER   Outcome Summary no c/o pain, amb blackman, x3 lap sites CDI       Problem: Pain, Acute (Adult)  Goal: Acceptable Pain Control/Comfort Level  Outcome: Ongoing (interventions implemented as appropriate)      Problem: Appendectomy (Adult)  Goal: Signs and Symptoms of Listed Potential Problems Will be Absent, Minimized or Managed (Appendectomy)  Outcome: Ongoing (interventions implemented as appropriate)      Problem: Skin Injury Risk (Adult)  Goal: Identify Related Risk Factors and Signs and Symptoms  Outcome: Ongoing (interventions implemented as appropriate)    Goal: Skin Health and Integrity  Outcome: Ongoing (interventions implemented as appropriate)      Problem: Fall Risk (Adult)  Goal: Absence of Fall  Outcome: Ongoing (interventions implemented as appropriate)

## 2019-02-18 LAB — POTASSIUM BLD-SCNC: 3.9 MMOL/L (ref 3.5–5.2)

## 2019-02-18 PROCEDURE — 99024 POSTOP FOLLOW-UP VISIT: CPT | Performed by: SURGERY

## 2019-02-18 PROCEDURE — 84132 ASSAY OF SERUM POTASSIUM: CPT | Performed by: SURGERY

## 2019-02-18 PROCEDURE — 25010000002 ENOXAPARIN PER 10 MG: Performed by: SURGERY

## 2019-02-18 RX ADMIN — FAMOTIDINE 20 MG: 20 TABLET, FILM COATED ORAL at 20:08

## 2019-02-18 RX ADMIN — SODIUM CHLORIDE, PRESERVATIVE FREE 10 ML: 5 INJECTION INTRAVENOUS at 09:31

## 2019-02-18 RX ADMIN — ENOXAPARIN SODIUM 40 MG: 40 INJECTION SUBCUTANEOUS at 09:31

## 2019-02-18 RX ADMIN — FAMOTIDINE 20 MG: 20 TABLET, FILM COATED ORAL at 09:31

## 2019-02-18 NOTE — PLAN OF CARE
Problem: Patient Care Overview  Goal: Plan of Care Review  Outcome: Ongoing (interventions implemented as appropriate)   02/18/19 0416   Coping/Psychosocial   Plan of Care Reviewed With patient   Plan of Care Review   Progress improving   OTHER   Outcome Summary No complaints voiced, ambulated blackman, denies pain, no c/o indigestion, eyes closed at long interval, resting quielty     Goal: Individualization and Mutuality  Outcome: Ongoing (interventions implemented as appropriate)    Goal: Discharge Needs Assessment  Outcome: Ongoing (interventions implemented as appropriate)    Goal: Interprofessional Rounds/Family Conf  Outcome: Ongoing (interventions implemented as appropriate)      Problem: Pain, Acute (Adult)  Goal: Acceptable Pain Control/Comfort Level  Outcome: Ongoing (interventions implemented as appropriate)      Problem: Skin Injury Risk (Adult)  Goal: Skin Health and Integrity  Outcome: Ongoing (interventions implemented as appropriate)      Problem: Fall Risk (Adult)  Goal: Absence of Fall  Outcome: Ongoing (interventions implemented as appropriate)

## 2019-02-18 NOTE — PROGRESS NOTES
Postoperative day #10 perforated appendicitis    Subjective:  Patient is really feeling much better.  Abdomen is softer, minimal pain, having bowel movements and passing flatus and tolerating full liquids, now with diet improving and feels like eating more.    Objective:  Patient afebrile, vital stable  Gen.: Awake alert and oriented, no acute distress  Abdomen: Softer and less distended, minimally tender, incisions look fine.    Assessment and plan:  -Acute perforated appendicitis, now status post laparoscopic appendectomy, improving  -Postop ileus also improving  Advance diet today.  Possible home tomorrow if he does well.    Leonardo Blanca MD  General and Endoscopic Surgery  [facility]    [address], [address]  [place], [place], [address]  P: [phone]  F: [phone]

## 2019-02-18 NOTE — PLAN OF CARE
Problem: Patient Care Overview  Goal: Plan of Care Review  Outcome: Ongoing (interventions implemented as appropriate)   02/18/19 1639   Coping/Psychosocial   Plan of Care Reviewed With patient   Plan of Care Review   Progress improving   OTHER   Outcome Summary Advanced to regular diet. Tolerated well. 2 BM's this shift. Denies pain Lap sites intact.       Problem: Pain, Acute (Adult)  Goal: Acceptable Pain Control/Comfort Level  Outcome: Ongoing (interventions implemented as appropriate)      Problem: Appendectomy (Adult)  Goal: Signs and Symptoms of Listed Potential Problems Will be Absent, Minimized or Managed (Appendectomy)  Outcome: Ongoing (interventions implemented as appropriate)   02/18/19 0109   Goal/Outcome Evaluation   Problems Assessed (Appendectomy) all   Problems Present (Appendectomy) situational response       Problem: Skin Injury Risk (Adult)  Goal: Skin Health and Integrity  Outcome: Ongoing (interventions implemented as appropriate)      Problem: Fall Risk (Adult)  Goal: Absence of Fall  Outcome: Outcome(s) achieved Date Met: 02/18/19

## 2019-02-18 NOTE — PROGRESS NOTES
Continued Stay Note  [facility]     Patient Name: Ajith Dimas  MRN: [id number]  Today's Date: 2/18/2019    Admit Date: 2/8/2019    Discharge Plan     Row Name 02/18/19 1315       Plan    Plan  Plan home with significant other.   DEVI Vee RN    Patient/Family in Agreement with Plan  yes    Plan Comments  Spoke to pt at bedside.  Pt denies any discharge needs.  Plan home with significant other.   DEVI Vee RN        Discharge Codes    No documentation.             Roya Vee RN

## 2019-02-19 VITALS
HEART RATE: 80 BPM | DIASTOLIC BLOOD PRESSURE: 61 MMHG | WEIGHT: 202.1 LBS | RESPIRATION RATE: 16 BRPM | BODY MASS INDEX: 25.13 KG/M2 | SYSTOLIC BLOOD PRESSURE: 117 MMHG | OXYGEN SATURATION: 94 % | TEMPERATURE: 97.4 F | HEIGHT: 75 IN

## 2019-02-19 PROCEDURE — 25010000002 ENOXAPARIN PER 10 MG: Performed by: SURGERY

## 2019-02-19 RX ORDER — HYDROCODONE BITARTRATE AND ACETAMINOPHEN 7.5; 325 MG/1; MG/1
1 TABLET ORAL EVERY 6 HOURS PRN
Qty: 20 TABLET | Refills: 0 | Status: SHIPPED | OUTPATIENT
Start: 2019-02-19 | End: 2019-02-25

## 2019-02-19 RX ADMIN — FAMOTIDINE 20 MG: 20 TABLET, FILM COATED ORAL at 08:52

## 2019-02-19 RX ADMIN — ENOXAPARIN SODIUM 40 MG: 40 INJECTION SUBCUTANEOUS at 08:52

## 2019-02-19 RX ADMIN — ACETAMINOPHEN 650 MG: 325 TABLET, FILM COATED ORAL at 03:54

## 2019-02-19 NOTE — PLAN OF CARE
Problem: Patient Care Overview  Goal: Plan of Care Review  Outcome: Ongoing (interventions implemented as appropriate)   02/19/19 0412   Coping/Psychosocial   Plan of Care Reviewed With patient   Plan of Care Review   Progress improving   OTHER   Outcome Summary Tylenol given for abdominal discomfort, with some relief stated, tolerating diet, home meds need to be addressed today, possible discharge     Goal: Individualization and Mutuality  Outcome: Ongoing (interventions implemented as appropriate)    Goal: Discharge Needs Assessment  Outcome: Ongoing (interventions implemented as appropriate)    Goal: Interprofessional Rounds/Family Conf  Outcome: Ongoing (interventions implemented as appropriate)      Problem: Pain, Acute (Adult)  Goal: Acceptable Pain Control/Comfort Level  Outcome: Ongoing (interventions implemented as appropriate)      Problem: Appendectomy (Adult)  Goal: Signs and Symptoms of Listed Potential Problems Will be Absent, Minimized or Managed (Appendectomy)  Outcome: Ongoing (interventions implemented as appropriate)      Problem: Skin Injury Risk (Adult)  Goal: Identify Related Risk Factors and Signs and Symptoms  Outcome: Ongoing (interventions implemented as appropriate)    Goal: Skin Health and Integrity  Outcome: Ongoing (interventions implemented as appropriate)

## 2019-02-19 NOTE — DISCHARGE SUMMARY
Discharge Summary    Patient name: Ajith Dimas    Medical record number: [id number]    Admission date: 2/8/2019  Discharge date:  2/19/2019    Attending physician: Leonardo Blanca MD    Primary care physician: Jamal Bah MD    Referring physician: Leonardo Blanca MD  [address] [address]    Consulting physician(s):none    Condition on discharge: improving    Primary Diagnoses: Acute complicated appendicitis    Secondary Diagnoses: None    Operative Procedure: Laparoscopic appendectomy    Hospital Course: The patient is a  73 y.o. male that was admitted to the hospital after undergoing laparoscopic appendectomy for acute perforated appendicitis.  His postoperative course was prolonged due to a significant ileus.  He was maintained on IV antibiotics for 1 week.  Nasogastric tube was required in the postoperative period and eventually this was able to be discontinued.  He had a mild leukocytosis and persistent distention, and as such CT was ordered, but no abscess was appreciated.  Gradually the ileus seemed to improve and his diet was advanced.  Pain was minimal.  On the day of discharge she was eating well, pain was minimal, walking well and no other significant problems.    Discharge medications:      Discharge Medications      New Medications      Instructions Start Date   HYDROcodone-acetaminophen 7.5-325 MG per tablet  Commonly known as:  NORCO   1 tablet, Oral, Every 6 Hours PRN         Continue These Medications      Instructions Start Date   CIALIS 5 MG tablet  Generic drug:  tadalafil   5 mg, Oral, Daily PRN      clobetasol 0.05 % topical foam  Commonly known as:  OLUX   1 application, Topical, 2 Times Daily PRN      fexofenadine 180 MG tablet  Commonly known as:  ALLEGRA   180 mg, Oral, Daily PRN      multivitamin with minerals tablet tablet   1 tablet, Oral, Daily      simvastatin 20 MG tablet  Commonly known as:  ZOCOR   20 mg, Oral, Nightly       triamterene-hydrochlorothiazide 37.5-25 MG per capsule  Commonly known as:  DYAZIDE   1 capsule, Oral, Every Morning      warfarin 2 MG tablet  Commonly known as:  COUMADIN   7 mg, Oral, 4 Times Weekly, Sun, Tues, Thurs, Sat HELD LAST DOSE 6/26/18      warfarin 2 MG tablet  Commonly known as:  COUMADIN   8 mg, Oral, 3 Times Weekly, Mon, Wed, Fri ON HOLD LAST DOSE 6/26/18             Discharge instructions: Resume all home medicines.  Gradually increase diet.  No lifting over 10 pounds.  May shower.       Follow-up appointment: Follow up with Leonardo Blanca MD in the office in 1 weeks. Call for appointment at [phone].

## 2019-02-19 NOTE — DISCHARGE INSTR - LAB
Follow up with Leonardo Blanca MD in the office in 1 weeks. Call for appointment at [phone]

## 2019-02-20 ENCOUNTER — READMISSION MANAGEMENT (OUTPATIENT)
Dept: CALL CENTER | Facility: HOSPITAL | Age: 74
End: 2019-02-20

## 2019-02-20 NOTE — PROGRESS NOTES
Case Management Discharge Note    Final Note: Pt discharged home on 2/19.   DEVI Vee RN    Destination      No service has been selected for the patient.      Durable Medical Equipment      No service has been selected for the patient.      Dialysis/Infusion      No service has been selected for the patient.      Home Medical Care      No service has been selected for the patient.      Community Resources      No service has been selected for the patient.        Other: Other(Private Auto)    Final Discharge Disposition Code: 01 - home or self-care

## 2019-02-22 ENCOUNTER — READMISSION MANAGEMENT (OUTPATIENT)
Dept: CALL CENTER | Facility: HOSPITAL | Age: 74
End: 2019-02-22

## 2019-02-22 NOTE — OUTREACH NOTE
General Surgery Week 1 Survey      Responses   Facility patient discharged from?  [place]   Does the patient have one of the following disease processes/diagnoses(primary or secondary)?  General Surgery   Is there a successful TCM telephone encounter documented?  No   Week 1 attempt successful?  No   Unsuccessful attempts  Attempt 1          Radah Soler RN

## 2019-02-25 ENCOUNTER — OFFICE VISIT (OUTPATIENT)
Dept: SURGERY | Facility: CLINIC | Age: 74
End: 2019-02-25

## 2019-02-25 VITALS — WEIGHT: 190 LBS | OXYGEN SATURATION: 99 % | HEART RATE: 101 BPM | BODY MASS INDEX: 23.75 KG/M2

## 2019-02-25 DIAGNOSIS — K35.30 ACUTE APPENDICITIS WITH LOCALIZED PERITONITIS, UNSPECIFIED WHETHER ABSCESS PRESENT, UNSPECIFIED WHETHER GANGRENE PRESENT, UNSPECIFIED WHETHER PERFORATION PRESENT: Primary | ICD-10-CM

## 2019-02-25 PROCEDURE — 99024 POSTOP FOLLOW-UP VISIT: CPT | Performed by: SURGERY

## 2019-02-26 ENCOUNTER — READMISSION MANAGEMENT (OUTPATIENT)
Dept: CALL CENTER | Facility: HOSPITAL | Age: 74
End: 2019-02-26

## 2019-02-26 NOTE — OUTREACH NOTE
General Surgery Week 1 Survey      Responses   Facility patient discharged from?  [place]   Does the patient have one of the following disease processes/diagnoses(primary or secondary)?  General Surgery   Is there a successful TCM telephone encounter documented?  No   Week 1 attempt successful?  Yes   Call start time  1021   Call end time  1025   Discharge diagnosis  Acute appendicitis with localized peritonitis   Meds reviewed with patient/caregiver?  Yes   Is the patient having any side effects they believe may be caused by any medication additions or changes?  No   Does the patient have all medications related to this admission filled (includes all antibiotics, pain medications, etc.)  Yes   Is the patient taking all medications as directed (includes completed medication regime)?  Yes   Does the patient have a follow up appointment scheduled with their surgeon?  Yes   Has the patient kept scheduled appointments due by today?  Yes   Has home health visited the patient within 72 hours of discharge?  N/A   Psychosocial issues?  No   Did the patient receive a copy of their discharge instructions?  Yes   Nursing interventions  Reviewed instructions with patient   What is the patient's perception of their health status since discharge?  Improving   Nursing interventions  Nurse provided patient education   Is the patient /caregiver able to teach back basic post-op care?  Keep incision areas clean,dry and protected, Take showers only when approved by MD-sponge bathpasha until then, Drive as instructed by MD in discharge instructions, Practice 'cough and deep breath'   Is the patient/caregiver able to teach back signs and symptoms of incisional infection?  Increased redness, swelling or pain at the incisonal site, Increased drainage or bleeding, Incisional warmth, Pus or odor from incision, Fever   Is the patient/caregiver able to teach back steps to recovery at home?  Eat a well-balance diet, Make a list of questions for  surgeon's appointment, Set small, achievable goals for return to baseline health, Rest and rebuild strength, gradually increase activity   Is the patient/caregiver able to teach back the hierarchy of who to call/visit for symptoms/problems? PCP, Specialist, Home health nurse, Urgent Care, ED, 911  Yes   Additional teach back comments  He will be on vacation next week, but welcomes a call the week after.  He has had no fever, steri strips off by MD yesterday, and he says he is feeling good and has no pain.   Week 1 call completed?  Yes          Selma Ludwig RN

## 2019-02-26 NOTE — PROGRESS NOTES
Follow-up appendectomy    Subjective:  This nice gentleman follows up after recent relatively complicated stay for acute appendicitis with perforation.  Postoperatively he developed a significant ileus requiring nasogastric decompression and over a week in the hospital, but never did develop an abscess.  At this time he is a couple of weeks out and is doing quite well.  Denies much pain, eating well, abdomen is no longer distended and bowels are functioning normally.    Objective:  Abdomen is soft and benign, incisions are well-healed    Pathology reviewed with patient and demonstrates only typical acute perforated and gangrenous appendicitis.    Assessment and plan:  -Acute perforated appendicitis with postop ileus, currently resolved and doing well  -May resume normal activities, gradually getting back into more vigorous exercise  -Follow-up here as needed.    Leonardo Blanca MD  General and Endoscopic Surgery  [facility]    [address], [address]  [place], [place], [address]  P: [phone]  F: [phone]

## 2019-03-05 ENCOUNTER — READMISSION MANAGEMENT (OUTPATIENT)
Dept: CALL CENTER | Facility: HOSPITAL | Age: 74
End: 2019-03-05

## 2019-03-05 NOTE — OUTREACH NOTE
General Surgery Week 3 Survey      Responses   Facility patient discharged from?  [place]   Does the patient have one of the following disease processes/diagnoses(primary or secondary)?  General Surgery   Week 3 attempt successful?  No   Unsuccessful attempts  Attempt 1          Jeanette Serrano RN

## 2020-12-03 ENCOUNTER — LAB REQUISITION (OUTPATIENT)
Dept: LAB | Facility: HOSPITAL | Age: 75
End: 2020-12-03

## 2020-12-03 DIAGNOSIS — Z00.00 ENCOUNTER FOR GENERAL ADULT MEDICAL EXAMINATION WITHOUT ABNORMAL FINDINGS: ICD-10-CM

## 2020-12-03 PROCEDURE — U0004 COV-19 TEST NON-CDC HGH THRU: HCPCS | Performed by: OPHTHALMOLOGY

## 2020-12-04 LAB — SARS-COV-2 RNA RESP QL NAA+PROBE: NOT DETECTED

## 2021-03-02 DIAGNOSIS — Z23 IMMUNIZATION DUE: ICD-10-CM

## 2023-07-03 NOTE — DISCHARGE SUMMARY
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       Presenting Problem/History of Present Illness:  Transient global amnesia [G45.4]     Hospital Course:  The patient is a 73 y.o. male who presented with an episode of transient global amnesia.  Please see admission H&P from 5/25/18 for further details.  On admission the patient was nearly back to baseline, and completely returned to baseline later that evening.  His neuro exam was non-focal.  He underwent a standard stroke/TIA workup which showed no CVA or significant vascular stenosis.  He had a normal echocardiogram which was negative for atrial shunting by saline contrast.  Dr. Serrano with neurology evaluated the patient and recommended no further neurologic workup.  There was no organic cause for his transient global amnesia found, and there will be no changes to his medication regimen.           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  I have ordered a follow up MRI in a few months, and if this is stable he will need no further workup.    He is receiving treatment from his PCP with amoxil for a URI/bronchitis.  There was no evidence of pneumonia clinically or on chest xray.  He should finish this course as scheduled.    He additionally has a small left inguinal hernia which had a stable appearance.  He has  "scheduled follow up with Dr. Jose Pierson to address this surgically.    Exam Today:  Blood pressure 117/73, pulse 79, temperature 97.8 °F (36.6 °C), temperature source Oral, resp. rate 18, height 190.5 cm (75\"), weight 90 kg (198 lb 8 oz), SpO2 99 %.  Constitutional: He is oriented to person, place, and time. No distress.   Head: Normocephalic and atraumatic.   Mouth/Throat: Oropharynx is clear and moist. Mild erythema of the throat but no exudate   Eyes: Conjunctivae and EOM are normal. Pupils are equal, round, and reactive to light.   Neck: Normal range of motion. Neck supple.   Cardiovascular: Normal rate, regular rhythm and intact distal pulses.    Pulmonary/Chest: Effort normal and breath sounds normal.   Abdominal: Soft. Bowel sounds are normal.   Musculoskeletal: He exhibits no edema or tenderness.   : small left inguinal hernia reducible and non-tender  Neurological: He is alert and oriented to person, place, and time. He has normal strength. No cranial nerve deficit. He displays no Babinski's sign on the right side.   Skin: Skin is warm and dry. No rash noted. He is not diaphoretic.   Psychiatric: He has a normal mood and affect. His behavior is normal.   Nursing note and vitals reviewed.    Procedures Performed:    MRI BRAIN W WO CONTRAST-, MRI ANGIOGRAM HEAD WO CONTRAST-, MRI ANGIOGRAM NECK W WO CONTRAST- 5/25/18  - No acute infarct. Suspected vascular malformation at the franklin, as described, follow-up suggested. Minimal periventricular white matter changes suggesting chronic small vessel ischemic change in a patient this age.  - No hemodynamically significant focal stenosis, aneurysm, or dissection is identified in the cervical carotid or vertebral arteries,or in the arteries at the base of the brain.  - A triangular 7 mm focus of enhancement and susceptibility artifact at the mid aspect of the franklin, predominantly to the right of midline, without associated edema or restricted diffusion, suggests " vascular malformation/developmental venous anomaly, possibility of an enhancing lesion/neoplasm seems less likely     Ajith Dimas   Echocardiogram   Order# [id number]   Reading physician: Jose Velez III, MD Ordering physician: Shaheen Shine MD Study date: 18   Patient Information     Patient Name  Ajith Dimas MRN  [id number] Sex  Male  (Age)  1945 (73 y.o.)   Admission Information     Admission Date/Time Discharge Date/Time Room/Bed   18  1028  514/1   Sedation Narrator Report     Sedation Narrator Report   Interpretation Summary     · Left ventricular systolic function is normal. Estimated EF = 64%.     Left Ventricle Left ventricular systolic function is normal. Calculated EF = 63.8%. Estimated EF was in agreement with the calculated EF. Estimated EF = 64%. Normal left ventricular cavity size and wall thickness noted. All left ventricular wall segments contract normally. Septal wall motion is normal. Left ventricular diastolic function is normal. Normal left atrial pressure. There is no evidence of a left ventricular mass or thrombus present.      Right Ventricle Normal right ventricular cavity size, wall thickness, systolic function and septal motion noted.      Left Atrium Normal left atrial size and volume noted. There is no spontaneous echo contrast present. The interatrial septum does not appear to be redundant. No interatrial septal aneurysm present. No lipomatous hypertrophy of the interatrial septum present. No evidence of a patent foramen ovale. No evidence of an atrial septal defect present. . Saline test results are negative.      Right Atrium Normal right atrial size noted.      Aortic Valve The aortic valve is structurally normal. No aortic valve regurgitation is present. No aortic valve stenosis is present.      Mitral Valve The mitral valve is normal in structure. No mitral valve regurgitation is present. No significant mitral valve stenosis is present.      Tricuspid  Valve The tricuspid valve is normal. No tricuspid valve stenosis is present. No tricuspid valve regurgitation is present.      Pulmonic Valve The pulmonic valve is structurally normal. There is no significant pulmonic valve stenosis present. There is no pulmonic valve regurgitation present.      Greater Vessels No dilation of the aortic root is present.      Pericardium The pericardium is normal. There is no evidence of pericardial effusion.              Consults:   Consults     Date and Time Order Name Status Description    5/25/2018 7953 Inpatient Neurology Consult Completed     5/25/2018 1256 [facility] (on-call MD unless specified) Completed            Discharge Disposition:  Home or Self Care    Discharge Medications:   Ajith Dimas   Home Medication Instructions LIANNE:[id number]    Printed on:05/26/18 130   Medication Information                      amoxicillin (AMOXIL) 500 MG capsule  Take 500 mg by mouth 2 (Two) Times a Day.             clobetasol (OLUX) 0.05 % topical foam  Apply 1 application topically 2 (Two) Times a Day As Needed.             fexofenadine (ALLEGRA) 180 MG tablet  Take 180 mg by mouth Daily As Needed.             Multiple Vitamins-Minerals (MULTIVITAMIN WITH MINERALS) tablet tablet  Take 1 tablet by mouth Daily.             simvastatin (ZOCOR) 20 MG tablet  Take 20 mg by mouth Every Night.             tadalafil (CIALIS) 5 MG tablet  Take 5 mg by mouth Daily As Needed for erectile dysfunction.             triamterene-hydrochlorothiazide (DYAZIDE) 37.5-25 MG per capsule  Take 1 capsule by mouth Every Morning.             warfarin (COUMADIN) 2 MG tablet  Take 8 mg by mouth 4 (Four) Times a Week. Sun, Tues, Thurs, Sat             warfarin (COUMADIN) 2 MG tablet  Take 7 mg by mouth 3 (Three) Times a Week. Mon, Wed, Fri                 Discharge Diet:   Diet Instructions     Diet: Regular; Thin       Discharge Diet:  Regular    Fluid Consistency:  Thin          Activity at Discharge:   Activity  Instructions     Activity as Tolerated             Follow-up Appointments:  Future Appointments  Date Time Provider Department Center   6/28/2018 10:00 AM  [facility]  [facility]     Additional Instructions for the Follow-ups that You Need to Schedule     Discharge Follow-up with PCP    As directed      Follow Up Details:  2 weeks         MRI Brain With & Without Contrast    Sep 12, 2018      Please forward results to his PCP    STEREOTACTIC GUIDANCE PROTOCOL?:  No    Will fiducial markers be needed for this procedure?:  No               Test Results Pending at Discharge:   Order Current Status    Blood Culture - Blood, Preliminary result    Blood Culture - Blood, Preliminary result           Shaheen Shine MD  05/26/18  1:07 PM    Time Spent on Discharge Activities: Less than 30 minutes.         no

## (undated) DEVICE — APPL CHLORAPREP W/TINT 26ML ORNG

## (undated) DEVICE — SUT VIC 2/0 TIES 18IN J111T

## (undated) DEVICE — SUT ETHIB 0 CT2 CR8 18IN CX27D

## (undated) DEVICE — DRN PENRS 5/8X18IN LTX

## (undated) DEVICE — NDL HYPO PRECISIONGLIDE REG 25G 1 1/2

## (undated) DEVICE — SUT PROLN 2/0 CT2 30IN 8411H

## (undated) DEVICE — SYR LUERLOK 20CC

## (undated) DEVICE — 3M™ STERI-STRIP™ REINFORCED ADHESIVE SKIN CLOSURES, R1547, 1/2 IN X 4 IN (12 MM X 100 MM), 6 STRIPS/ENVELOPE: Brand: 3M™ STERI-STRIP™

## (undated) DEVICE — ANTIBACTERIAL UNDYED BRAIDED (POLYGLACTIN 910), SYNTHETIC ABSORBABLE SUTURE: Brand: COATED VICRYL

## (undated) DEVICE — PENCL E/S HNDSWCH ROCKR CB

## (undated) DEVICE — 3M™ STERI-STRIP™ COMPOUND BENZOIN TINCTURE 40 BAGS/CARTON 4 CARTONS/CASE C1544: Brand: 3M™ STERI-STRIP™

## (undated) DEVICE — SPNG GZ WOVN 4X4IN 12PLY 10/BX STRL

## (undated) DEVICE — PK PROC MAJ 40

## (undated) DEVICE — SUT MNCRYL PLS ANTIB UD 4/0 PS2 18IN

## (undated) DEVICE — ENDOCUT SCISSOR TIP, DISPOSABLE: Brand: RENEW

## (undated) DEVICE — VIOLET BRAIDED (POLYGLACTIN 910), SYNTHETIC ABSORBABLE SUTURE: Brand: COATED VICRYL

## (undated) DEVICE — ENCORE® LATEX ORTHO SIZE 7.5, STERILE LATEX POWDER-FREE SURGICAL GLOVE: Brand: ENCORE

## (undated) DEVICE — ENDOPOUCH RETRIEVER SPECIMEN RETRIEVAL BAGS: Brand: ENDOPOUCH RETRIEVER

## (undated) DEVICE — ENDOPATH ETS-FLEX45 ARTICULATING ENDOSCOPIC LINEAR CUTTER, NO RELOAD: Brand: ENDOPATH

## (undated) DEVICE — BNDG ADHS FABRC 1X3IN

## (undated) DEVICE — GLV SURG BIOGEL LTX PF 8 1/2

## (undated) DEVICE — IRRIGATOR BULB ASEPTO 60CC STRL

## (undated) DEVICE — LOU LAP CHOLE: Brand: MEDLINE INDUSTRIES, INC.

## (undated) DEVICE — DRSNG SURESITE WNDW 4X4.5

## (undated) DEVICE — ENDOPATH XCEL BLUNT TIP TROCARS WITH SMOOTH SLEEVES: Brand: ENDOPATH XCEL

## (undated) DEVICE — VISUALIZATION SYSTEM: Brand: CLEARIFY

## (undated) DEVICE — TOTAL TRAY, 16FR 10ML SIL FOLEY, URN: Brand: MEDLINE

## (undated) DEVICE — ENDOPATH XCEL BLADELESS TROCARS WITH STABILITY SLEEVES: Brand: ENDOPATH XCEL

## (undated) DEVICE — ENDOPATH XCEL UNIVERSAL TROCAR STABLILITY SLEEVES: Brand: ENDOPATH XCEL